# Patient Record
Sex: FEMALE | Race: WHITE | Employment: FULL TIME | URBAN - METROPOLITAN AREA
[De-identification: names, ages, dates, MRNs, and addresses within clinical notes are randomized per-mention and may not be internally consistent; named-entity substitution may affect disease eponyms.]

---

## 2017-06-27 PROBLEM — F32.81 PMDD (PREMENSTRUAL DYSPHORIC DISORDER): Status: ACTIVE | Noted: 2017-06-27

## 2017-06-27 PROBLEM — N94.6 DYSMENORRHEA: Status: ACTIVE | Noted: 2017-06-27

## 2017-06-27 PROBLEM — K42.9 UMBILICAL HERNIA WITHOUT OBSTRUCTION AND WITHOUT GANGRENE: Status: ACTIVE | Noted: 2017-06-27

## 2017-06-27 NOTE — PATIENT INSTRUCTIONS
Thank you for choosing Jaylen Wood MD at [facility]  To Do: Sukumar Parson  1. Check out various options for hormonal fluctuations  2. Start fluoxetine 1/2 tab daily for 2 weeks, then increase to full tablet after that  3.  Let us know if side e medicines prescribed to you and be aware of all of the risks of treatment even beyond those discussed today.  All therapies have potential risk of harm or side effects or medication interactions.  It is your duty and for your safety to discuss with the pha

## 2017-06-27 NOTE — PROGRESS NOTES
[facility] Internal Medicine Office Note  Chief Complaint:   Patient presents with:  Irregular Periods  Anxiety: felt more emotional before her periods       HPI:   This is a 33year old female coming in for  HPI  Irregular periods and anxiety  I following:    Height as of this encounter: 65\". Weight as of this encounter: 176 lb. Vital signs reviewed. Appears stated age, well groomed. With brown hair  Physical Exam    Constitutional: She appears well-developed and well-nourished.    Deisy Doyle Patient Active Problem List:     Bunion     Abdominal pain     Obesity (BMI 30-39. 9)     Encounter for general adult medical examination with abnormal findings     Kidney cysts     Umbilical hernia without obstruction and without gangrene     Dysmenorrhe

## 2017-10-06 ENCOUNTER — HOSPITAL ENCOUNTER (EMERGENCY)
Age: 32
Discharge: HOME | End: 2017-10-06
Attending: EMERGENCY MEDICINE
Payer: COMMERCIAL

## 2017-10-06 VITALS
BODY MASS INDEX: 27.49 KG/M2 | TEMPERATURE: 97 F | HEART RATE: 91 BPM | SYSTOLIC BLOOD PRESSURE: 124 MMHG | HEIGHT: 65 IN | DIASTOLIC BLOOD PRESSURE: 69 MMHG | OXYGEN SATURATION: 99 % | WEIGHT: 165 LBS | RESPIRATION RATE: 18 BRPM

## 2017-10-06 DIAGNOSIS — L03.90 WOUND CELLULITIS: Primary | ICD-10-CM

## 2017-10-06 PROCEDURE — 87070 CULTURE OTHR SPECIMN AEROBIC: CPT | Performed by: EMERGENCY MEDICINE

## 2017-10-06 PROCEDURE — 87205 SMEAR GRAM STAIN: CPT | Performed by: EMERGENCY MEDICINE

## 2017-10-06 PROCEDURE — 99284 EMERGENCY DEPT VISIT MOD MDM: CPT

## 2017-10-06 PROCEDURE — 96365 THER/PROPH/DIAG IV INF INIT: CPT

## 2017-10-06 RX ORDER — CEPHALEXIN 500 MG/1
500 CAPSULE ORAL 4 TIMES DAILY
Qty: 40 CAPSULE | Refills: 0 | Status: SHIPPED | OUTPATIENT
Start: 2017-10-06 | End: 2017-10-16

## 2017-10-07 NOTE — ED PROVIDER NOTES
Patient Seen in: [facility] In [place]    History   Patient presents with:  Cellulitis (integumentary, infectious)    Stated Complaint: laceration left ankle last saturday, now swollen foot and red    HPI    Patient sustained a lacera a evulsion type laceration. There is some honey colored crusting in the wound with a bright rim of erythema. Inferior to this over the lateral aspect of the ankle and foot there is some faint erythema and mild warmth.   Mild mild diffuse tenderness is not

## 2017-10-07 NOTE — ED INITIAL ASSESSMENT (HPI)
Pt cut left lateral ankle with shaver last sat. Now c/o redness and swelling. pt denies fevers or chills.

## 2017-10-13 ENCOUNTER — OFFICE VISIT (OUTPATIENT)
Dept: FAMILY MEDICINE CLINIC | Facility: CLINIC | Age: 32
End: 2017-10-13

## 2017-10-13 VITALS
RESPIRATION RATE: 16 BRPM | HEART RATE: 82 BPM | BODY MASS INDEX: 29.82 KG/M2 | TEMPERATURE: 99 F | DIASTOLIC BLOOD PRESSURE: 78 MMHG | SYSTOLIC BLOOD PRESSURE: 118 MMHG | HEIGHT: 65 IN | WEIGHT: 179 LBS

## 2017-10-13 DIAGNOSIS — S81.812D LACERATION OF LEFT LOWER EXTREMITY, SUBSEQUENT ENCOUNTER: ICD-10-CM

## 2017-10-13 DIAGNOSIS — Z09 HOSPITAL DISCHARGE FOLLOW-UP: Primary | ICD-10-CM

## 2017-10-13 PROCEDURE — 99214 OFFICE O/P EST MOD 30 MIN: CPT | Performed by: NURSE PRACTITIONER

## 2017-10-13 NOTE — PATIENT INSTRUCTIONS
Thank you for choosing JOVANNI Hernandez at [place] [address]  To Do: Love Rough  1.  Can use topical neosporin and mederma after (it heals up)  -continue w/ finishing all abx  Effective 6/19/17 until November 2017  Due to Lindsey Dove potential risk of harm or side effects or medication interactions.  It is your duty and for your safety to discuss with the pharmacist and our office with questions, and to notify us and stop treatment if problems arise, but know that our intention is that

## 2017-10-13 NOTE — PROGRESS NOTES
[facility] Internal Medicine Office Note  Chief Complaint:   Patient presents with:  ER F/U: [facility] ER 10/6/17 for L leg wound - dx: cellulitis - still on abx    HPI:   This is a 33year old female coming in for  HPI  Here today for F/u from ER o /78   Pulse 82   Temp 98.6 °F (37 °C) (Oral)   Resp 16   Ht 65\"   Wt 179 lb   LMP 09/15/2017   BMI 29.79 kg/m²  Estimated body mass index is 29.79 kg/m² as calculated from the following:    Height as of this encounter: 65\".     Weight as of this enc Tobacco Cessation Counseling 2 Years due on 01/01/1998  Annual Physical due on 09/15/2017  Influenza Vaccine(1) due on 09/01/2017  Patient/Caregiver Education: There are no barriers to learning. Medical education done.  Outcome: Patient verbalizes Brie Acevedo

## 2017-11-01 ENCOUNTER — OFFICE VISIT (OUTPATIENT)
Dept: FAMILY MEDICINE CLINIC | Facility: CLINIC | Age: 32
End: 2017-11-01

## 2017-11-01 VITALS
SYSTOLIC BLOOD PRESSURE: 116 MMHG | TEMPERATURE: 98 F | HEIGHT: 65 IN | WEIGHT: 182 LBS | RESPIRATION RATE: 16 BRPM | DIASTOLIC BLOOD PRESSURE: 72 MMHG | BODY MASS INDEX: 30.32 KG/M2 | HEART RATE: 74 BPM

## 2017-11-01 DIAGNOSIS — Z00.00 WELLNESS EXAMINATION: Primary | ICD-10-CM

## 2017-11-01 DIAGNOSIS — Z00.00 LABORATORY EXAMINATION ORDERED AS PART OF A COMPLETE PHYSICAL EXAMINATION: ICD-10-CM

## 2017-11-01 PROCEDURE — 99395 PREV VISIT EST AGE 18-39: CPT | Performed by: FAMILY MEDICINE

## 2017-11-01 RX ORDER — FLUOXETINE 20 MG/1
20 TABLET, FILM COATED ORAL DAILY
Qty: 90 TABLET | Refills: 1 | Status: SHIPPED | OUTPATIENT
Start: 2017-11-01 | End: 2018-01-10 | Stop reason: ALTCHOICE

## 2017-11-01 RX ORDER — FLUOXETINE 20 MG/1
20 TABLET, FILM COATED ORAL DAILY
COMMUNITY
End: 2018-01-10

## 2017-11-01 NOTE — TELEPHONE ENCOUNTER
Requesting fluoxetine 20mg  LOV: 11/1/17  RTC: 1 year  Last Labs: 11/1/17  Filled: 6/27/17 #60  with 1 refills    No future appointments.

## 2017-11-01 NOTE — H&P
Wellness Exam    REASON FOR VISIT:    Charisse Cali is a 33year old female who presents for an [address]     Current Complaints: none; here today for physical and biometrics for work  Flu shot: see immunization record  Health Maintenance Date Value   01/09/2015 neg       Colonoscopy Screen Every 10 years There are no preventive care reminders to display for this patient. Flex Sigmoidoscopy Screen  Every 5 years No results found for this or any previous visit.     Fecal Occult Blood  An Last attempt to quit: 12/15/2012  Smokeless tobacco: Never Used                      Alcohol use: No                   REVIEW OF SYSTEMS:   Constitutional: Negative for fever, chills and fatigue.    HENT: Negative for hearing loss, congestion, sore throat a Neurological: She is alert and oriented to person, place, and time. She has normal reflexes. Skin: Skin is warm. No rash noted. No erythema.  with normal hair  Psychiatric: She has a normal mood and affect and her behavior is normal.     ASSESSMENT AND are no barriers to learning. Medical education done. Outcome: Patient verbalizes understanding. Educated by: MD     The patient indicates understanding of these issues and agrees to the plan.     SUGGESTED VACCINATIONS - Influenza, Pneumococcal, Zoster,

## 2017-11-01 NOTE — PATIENT INSTRUCTIONS
Thank you for choosing Glenis Slater MD at [facility]  To Do: Delfina Coffey   1.   2. High Blood pressure  What Is a Normal Blood Pressure?   The Joint National Committee on Prevention, Detection, Evaluation, and Treatment of High Blood Pressu Bleeding from the aorta, the large blood vessel that supplies blood to the abdomen, pelvis, and legs   Heart failure   Poor blood supply to the legs  Erectile Dysfunction  Problems with your vision    Overview  \"Blood pressure\" is the force of blood push This diet is not only rich in important nutrients and fiber, but it also includes foods that contain far more potassium (4,700 milligrams (mg)/day), calcium (1,250 mg/day), and magnesium (500 mg/day) and much less sodium (salt) than the typical [employer] Tomatoes, carrots, broccoli, sweet potatoes, greens and other vegetables are full of fiber, vitamins, and such minerals as potassium and magnesium. Examples of one serving include 1 cup raw leafy green vegetables or 1/2 cup cut-up raw or cooked vegetables. ? Low-fat or fat-free frozen yogurt can help you boost the amount of dairy products you eat while offering a sweet treat.  Add fruit for a healthy twist.   ? If you have trouble digesting dairy products, choose lactose-free products or consider taking an ov ? Nuts sometimes get a bad rap because of their fat content, but they contain healthy types of fat — monounsaturated fat and omega-3 fatty acids. They're high in calories, however, so eat them in moderation.  Try adding them to stir-fries, salads or cereals ? When you eat sweets, choose those that are fat-free or low-fat, such as sorbets, fruit ices, jelly beans, hard candy, carlos crackers or low-fat cookies. ?  Artificial sweeteners such as aspartame (NutraSweet, Equal) and sucralose (Splenda) may help sat ? Buying foods labeled \"no salt added,\" \"sodium-free,\" \"low sodium\" or \"very low sodium\"   One teaspoon of table salt has about 2,300 mg of sodium, and 2/3 teaspoon of table salt has about 1,500 mg of sodium.  When you read food labels, you may be s •Cardiac Testing in ER building Building A second floor Cardiac Testing [phone] (For example: Holter Monitor, Cardiac Stress tests,Event Monitor, or 2D Echocardiograms)  •[facility] call [phone] usually in [address] in Clinic in We cannot refill your maintenance medications at a preventative wellness visit. To best provide you care, patients receiving maintenance medications need to be seen at least twice a year.     How to Quit Smoking  Smoking is one of the hardest habits to aletha After reviewing your smoking patterns and past attempts to quit, your doctor may offer a prescription medicine such as bupropion, varenicline, a nicotine inhaler, or nasal spray. Each has advantages and side effects. Your doctor can review these with you.

## 2018-01-10 ENCOUNTER — OFFICE VISIT (OUTPATIENT)
Dept: FAMILY MEDICINE CLINIC | Facility: CLINIC | Age: 33
End: 2018-01-10

## 2018-01-10 VITALS
HEIGHT: 65 IN | DIASTOLIC BLOOD PRESSURE: 60 MMHG | SYSTOLIC BLOOD PRESSURE: 120 MMHG | RESPIRATION RATE: 14 BRPM | WEIGHT: 179 LBS | HEART RATE: 96 BPM | TEMPERATURE: 98 F | OXYGEN SATURATION: 99 % | BODY MASS INDEX: 29.82 KG/M2

## 2018-01-10 DIAGNOSIS — R06.00 DYSPNEA, UNSPECIFIED TYPE: ICD-10-CM

## 2018-01-10 DIAGNOSIS — R06.2 WHEEZING: ICD-10-CM

## 2018-01-10 DIAGNOSIS — R05.8 PRODUCTIVE COUGH: Primary | ICD-10-CM

## 2018-01-10 PROCEDURE — 99214 OFFICE O/P EST MOD 30 MIN: CPT | Performed by: FAMILY MEDICINE

## 2018-01-10 RX ORDER — AMOXICILLIN AND CLAVULANATE POTASSIUM 875; 125 MG/1; MG/1
1 TABLET, FILM COATED ORAL 2 TIMES DAILY
Qty: 20 TABLET | Refills: 0 | Status: SHIPPED | OUTPATIENT
Start: 2018-01-10 | End: 2018-01-20

## 2018-01-10 RX ORDER — PREDNISONE 20 MG/1
20 TABLET ORAL 2 TIMES DAILY
Qty: 10 TABLET | Refills: 0 | Status: SHIPPED | OUTPATIENT
Start: 2018-01-10 | End: 2018-01-15 | Stop reason: ALTCHOICE

## 2018-01-10 RX ORDER — ALBUTEROL SULFATE 90 UG/1
2 AEROSOL, METERED RESPIRATORY (INHALATION) EVERY 4 HOURS PRN
Qty: 1 INHALER | Refills: 0 | Status: SHIPPED | OUTPATIENT
Start: 2018-01-10 | End: 2018-05-15 | Stop reason: ALTCHOICE

## 2018-01-15 ENCOUNTER — OFFICE VISIT (OUTPATIENT)
Dept: FAMILY MEDICINE CLINIC | Facility: CLINIC | Age: 33
End: 2018-01-15

## 2018-01-15 VITALS
WEIGHT: 180 LBS | TEMPERATURE: 98 F | BODY MASS INDEX: 29.99 KG/M2 | HEIGHT: 65 IN | OXYGEN SATURATION: 99 % | SYSTOLIC BLOOD PRESSURE: 118 MMHG | RESPIRATION RATE: 16 BRPM | DIASTOLIC BLOOD PRESSURE: 72 MMHG | HEART RATE: 83 BPM

## 2018-01-15 DIAGNOSIS — R06.2 WHEEZING: ICD-10-CM

## 2018-01-15 DIAGNOSIS — R05.8 PRODUCTIVE COUGH: Primary | ICD-10-CM

## 2018-01-15 PROCEDURE — 99213 OFFICE O/P EST LOW 20 MIN: CPT | Performed by: FAMILY MEDICINE

## 2018-01-16 NOTE — PROGRESS NOTES
HPI:    Patient ID: Daniella Rodriguez is a 29year old female. HPI  The patient is here for follow-up of productive cough. She is doing better. She is on Augmentin. He is albuterol as needed.   Review of Systems  Negative except for the above       Curr by mouth 2 (two) times daily. Albuterol Sulfate  (90 Base) MCG/ACT Inhalation Aero Soln 1 Inhaler 0      Sig: Inhale 2 puffs into the lungs every 4 (four) hours as needed for Wheezing or Shortness of Breath.            Imaging & Referrals:  None

## 2018-01-18 NOTE — PROGRESS NOTES
HPI:    Patient ID: Adi Villatoro is a 29year old female. HPI  Patient is here for follow-up of productive cough and wheezing. She is doing somewhat better. No side effects antibiotic.   Review of Systems  Negative except for the above       Current O

## 2018-01-24 ENCOUNTER — OFFICE VISIT (OUTPATIENT)
Dept: FAMILY MEDICINE CLINIC | Facility: CLINIC | Age: 33
End: 2018-01-24

## 2018-01-24 VITALS
TEMPERATURE: 98 F | OXYGEN SATURATION: 98 % | RESPIRATION RATE: 12 BRPM | DIASTOLIC BLOOD PRESSURE: 60 MMHG | SYSTOLIC BLOOD PRESSURE: 120 MMHG | HEART RATE: 118 BPM

## 2018-01-24 DIAGNOSIS — R06.00 DYSPNEA, UNSPECIFIED TYPE: ICD-10-CM

## 2018-01-24 DIAGNOSIS — R05.8 PRODUCTIVE COUGH: Primary | ICD-10-CM

## 2018-01-24 PROCEDURE — 99213 OFFICE O/P EST LOW 20 MIN: CPT | Performed by: FAMILY MEDICINE

## 2018-01-25 NOTE — PROGRESS NOTES
HPI:    Patient ID: Marco Azul is a 29year old female. HPI  Patient is here for follow-up of productive cough and dyspnea. She is feeling much better now. She had been using albuterol inhaler.   Review of Systems  Negative except for the above

## 2018-02-14 NOTE — TELEPHONE ENCOUNTER
Requested Medications   VENTOLIN HFA INH W/DOS CTR 200PUFFS  Will file in chart as: VENTOLIN  (90 Base) MCG/ACT Inhalation Aero Soln  INHALE 2 PUFFS INTO THE LUNGS EVERY 4 HOURS AS NEEDED FOR WHEEZING OR SHORTNESS OF BREATH  Disp: 18 g Refills: 0

## 2018-05-15 ENCOUNTER — OFFICE VISIT (OUTPATIENT)
Dept: FAMILY MEDICINE CLINIC | Facility: CLINIC | Age: 33
End: 2018-05-15

## 2018-05-15 VITALS
RESPIRATION RATE: 12 BRPM | BODY MASS INDEX: 30 KG/M2 | DIASTOLIC BLOOD PRESSURE: 78 MMHG | WEIGHT: 183 LBS | OXYGEN SATURATION: 100 % | TEMPERATURE: 98 F | HEART RATE: 73 BPM | SYSTOLIC BLOOD PRESSURE: 118 MMHG

## 2018-05-15 DIAGNOSIS — B02.9 HERPES ZOSTER WITHOUT COMPLICATION: ICD-10-CM

## 2018-05-15 DIAGNOSIS — Z00.00 ROUTINE GENERAL MEDICAL EXAMINATION AT A HEALTH CARE FACILITY: Primary | ICD-10-CM

## 2018-05-15 PROCEDURE — 99395 PREV VISIT EST AGE 18-39: CPT | Performed by: FAMILY MEDICINE

## 2018-05-15 PROCEDURE — 99212 OFFICE O/P EST SF 10 MIN: CPT | Performed by: FAMILY MEDICINE

## 2018-05-15 RX ORDER — VALACYCLOVIR HYDROCHLORIDE 1 G/1
1 TABLET, FILM COATED ORAL 3 TIMES DAILY
Qty: 21 TABLET | Refills: 0 | Status: SHIPPED | OUTPATIENT
Start: 2018-05-15 | End: 2018-05-22

## 2018-05-15 NOTE — PROGRESS NOTES
HPI:   Jhon Silverio is a 29year old female who presents for a complete physical exam. Symptoms: denies discharge, itching, burning or dysuria. Patient has no complaints today.     Wt Readings from Last 6 Encounters:  05/15/18 : 183 lb  01/15/18 : 180 lb fL   MCH 28.1 27.0 - 33.0 pg   MCHC 33.7 32.0 - 36.0 g/dL   RDW 12.9 11.0 - 15.0 %   PLATELET COUNT 316 322 - 400 Thousand/uL   MPV 10.3 7.5 - 12.5 fL   ABSOLUTE NEUTROPHILS 3,323 1,500 - 7,800 cells/uL   ABSOLUTE LYMPHOCYTES 3,315 850 - 3,900 cells/uL   A 118/78   Pulse 73   Temp 97.9 °F (36.6 °C) (Oral)   Resp 12   Wt 183 lb   LMP 05/10/2018   SpO2 100%   BMI 30.45 kg/m²   Body mass index is 30.45 kg/m².    GENERAL: well developed, well nourished and in no apparent distress  SKIN: Macular vesicular rash in these recommendations and agrees to the plan. The patient is asked to return for complete physical yearly. Patient seen her gynecologist and was given Prozac for having irregular menstrual cycle 1 every 1-3 months.   She would like to consider hormona

## 2018-05-22 ENCOUNTER — OFFICE VISIT (OUTPATIENT)
Dept: FAMILY MEDICINE CLINIC | Facility: CLINIC | Age: 33
End: 2018-05-22

## 2018-05-22 VITALS
WEIGHT: 183 LBS | HEIGHT: 65 IN | HEART RATE: 92 BPM | SYSTOLIC BLOOD PRESSURE: 118 MMHG | BODY MASS INDEX: 30.49 KG/M2 | RESPIRATION RATE: 12 BRPM | TEMPERATURE: 99 F | OXYGEN SATURATION: 100 % | DIASTOLIC BLOOD PRESSURE: 70 MMHG

## 2018-05-22 DIAGNOSIS — R79.89 ELEVATED PLATELET COUNT: ICD-10-CM

## 2018-05-22 DIAGNOSIS — E78.00 HIGH CHOLESTEROL: ICD-10-CM

## 2018-05-22 DIAGNOSIS — B02.9 HERPES ZOSTER WITHOUT COMPLICATION: ICD-10-CM

## 2018-05-22 DIAGNOSIS — D49.2 NEOPLASM OF SKIN OF FOREHEAD: Primary | ICD-10-CM

## 2018-05-22 PROCEDURE — 99214 OFFICE O/P EST MOD 30 MIN: CPT | Performed by: FAMILY MEDICINE

## 2018-05-22 NOTE — PROGRESS NOTES
HPI:    Patient ID: Betsy Pozo is a 29year old female. HPI  Patient is here for follow-up of shingles and lab work. She also has a skin lesion between her eyebrow that she would like to get removed.   Shingles is much better and she has minimal itch still has any symptoms I would recommend a course of prednisone and she will call me regarding that. Blood work reviewed showing mildly elevated cholesterol pattern and counseled regarding good diet and exercise.   Platelet count mildly elevated no signs o

## 2018-08-29 ENCOUNTER — TELEPHONE (OUTPATIENT)
Dept: FAMILY MEDICINE CLINIC | Facility: CLINIC | Age: 33
End: 2018-08-29

## 2018-08-29 NOTE — TELEPHONE ENCOUNTER
rec'd a msg left on 08-28-18 at 6:07 pm - patient called to cancel their appt on 08-30-18 at 10:15 am - appt cancelled

## 2018-09-21 ENCOUNTER — APPOINTMENT (OUTPATIENT)
Dept: LAB | Age: 33
End: 2018-09-21
Attending: FAMILY MEDICINE
Payer: COMMERCIAL

## 2018-09-21 LAB
ERYTHROCYTE [DISTWIDTH] IN BLOOD BY AUTOMATED COUNT: 12.6 % (ref 11.5–16)
HCT VFR BLD AUTO: 44.6 % (ref 34–50)
HGB BLD-MCNC: 14.3 G/DL (ref 12–16)
MCH RBC QN AUTO: 27.5 PG (ref 27–33.2)
MCHC RBC AUTO-ENTMCNC: 32.1 G/DL (ref 31–37)
MCV RBC AUTO: 85.8 FL (ref 81–100)
PLATELET # BLD AUTO: 391 10(3)UL (ref 150–450)
RBC # BLD AUTO: 5.2 X10(6)UL (ref 3.8–5.1)
RED CELL DISTRIBUTION WIDTH-SD: 39.2 FL (ref 35.1–46.3)
WBC # BLD AUTO: 9.5 X10(3) UL (ref 4–13)

## 2018-10-21 ENCOUNTER — APPOINTMENT (OUTPATIENT)
Dept: CT IMAGING | Age: 33
End: 2018-10-21
Attending: EMERGENCY MEDICINE
Payer: COMMERCIAL

## 2018-10-21 ENCOUNTER — HOSPITAL ENCOUNTER (EMERGENCY)
Age: 33
Discharge: HOME | End: 2018-10-21
Attending: EMERGENCY MEDICINE
Payer: COMMERCIAL

## 2018-10-21 VITALS
SYSTOLIC BLOOD PRESSURE: 106 MMHG | WEIGHT: 175 LBS | OXYGEN SATURATION: 100 % | RESPIRATION RATE: 16 BRPM | BODY MASS INDEX: 29.16 KG/M2 | TEMPERATURE: 98 F | DIASTOLIC BLOOD PRESSURE: 65 MMHG | HEIGHT: 65 IN | HEART RATE: 81 BPM

## 2018-10-21 DIAGNOSIS — G43.909 MIGRAINE WITHOUT STATUS MIGRAINOSUS, NOT INTRACTABLE, UNSPECIFIED MIGRAINE TYPE: ICD-10-CM

## 2018-10-21 DIAGNOSIS — H10.31 ACUTE CONJUNCTIVITIS OF RIGHT EYE, UNSPECIFIED ACUTE CONJUNCTIVITIS TYPE: Primary | ICD-10-CM

## 2018-10-21 PROCEDURE — 99284 EMERGENCY DEPT VISIT MOD MDM: CPT

## 2018-10-21 PROCEDURE — 70450 CT HEAD/BRAIN W/O DYE: CPT | Performed by: EMERGENCY MEDICINE

## 2018-10-21 RX ORDER — TETRACAINE HYDROCHLORIDE 5 MG/ML
2 SOLUTION OPHTHALMIC ONCE
Status: COMPLETED | OUTPATIENT
Start: 2018-10-21 | End: 2018-10-21

## 2018-10-21 RX ORDER — TOBRAMYCIN 3 MG/ML
2 SOLUTION/ DROPS OPHTHALMIC EVERY 6 HOURS
Qty: 1 BOTTLE | Refills: 0 | Status: SHIPPED | OUTPATIENT
Start: 2018-10-21 | End: 2018-10-28

## 2018-10-21 RX ORDER — BUTALBITAL, ACETAMINOPHEN AND CAFFEINE 50; 325; 40 MG/1; MG/1; MG/1
1-2 TABLET ORAL EVERY 4 HOURS PRN
Qty: 10 TABLET | Refills: 0 | Status: SHIPPED | OUTPATIENT
Start: 2018-10-21 | End: 2018-10-28

## 2018-10-21 NOTE — ED PROVIDER NOTES
Patient Seen in: [facility] [place]    History   Patient presents with:  Headache (neurologic)    Stated Complaint: headache for two weeks, right eye blurred vision    HPI    52-year-old female complaining of right eye redness.   Pa Acuity: 20/30, Uncorrected    Physical Exam    Patient is alert and oriented x3 no acute distress HEENT exam the pupils are equal round react to light extraocular muscles are intact left eye is grossly normal the right eye the conjunctiva is moderately inj 0    Butalbital-APAP-Caffeine -40 MG Oral Tab  Take 1-2 tablets by mouth every 4 (four) hours as needed for Pain.   Qty: 10 tablet Refills: 0

## 2018-10-21 NOTE — ED INITIAL ASSESSMENT (HPI)
Pt c/o eye redness and drainage. Blurred vision 2 days. No fever. Also c/o intermittent headache x 2 weeks. \"Taken excedrin, but headache comes back\".

## 2018-10-22 ENCOUNTER — PATIENT OUTREACH (OUTPATIENT)
Dept: FAMILY MEDICINE CLINIC | Facility: CLINIC | Age: 33
End: 2018-10-22

## 2018-10-24 ENCOUNTER — OFFICE VISIT (OUTPATIENT)
Dept: FAMILY MEDICINE CLINIC | Facility: CLINIC | Age: 33
End: 2018-10-24
Payer: COMMERCIAL

## 2018-10-24 VITALS
WEIGHT: 182 LBS | TEMPERATURE: 99 F | OXYGEN SATURATION: 98 % | BODY MASS INDEX: 30.32 KG/M2 | HEIGHT: 65 IN | HEART RATE: 94 BPM | DIASTOLIC BLOOD PRESSURE: 70 MMHG | RESPIRATION RATE: 12 BRPM | SYSTOLIC BLOOD PRESSURE: 120 MMHG

## 2018-10-24 DIAGNOSIS — H10.9 CONJUNCTIVITIS OF RIGHT EYE, UNSPECIFIED CONJUNCTIVITIS TYPE: ICD-10-CM

## 2018-10-24 DIAGNOSIS — G43.009 MIGRAINE WITHOUT AURA AND WITHOUT STATUS MIGRAINOSUS, NOT INTRACTABLE: ICD-10-CM

## 2018-10-24 DIAGNOSIS — J30.9 ALLERGIC RHINITIS, UNSPECIFIED SEASONALITY, UNSPECIFIED TRIGGER: ICD-10-CM

## 2018-10-24 DIAGNOSIS — R53.82 CHRONIC FATIGUE: Primary | ICD-10-CM

## 2018-10-24 PROCEDURE — 99214 OFFICE O/P EST MOD 30 MIN: CPT | Performed by: FAMILY MEDICINE

## 2018-10-24 NOTE — PROGRESS NOTES
HPI:    Patient ID: Wenceslao Hay is a 29year old female. HPI  Patient is here for follow-up of ER visit for migraine headache. She was given Fioricet also diagnosed with right eye conjunctivitis and given tobramycin eyedrops.   Eye has improved signif tenderness, negative straight leg raise bilaterally, no joint swelling or tenderness, normal strength, range of motion and sensation in all four extremities.   Neurologic:  alert and oriented to time, space, and person, cranial nerves two through twelve daljit

## 2018-12-13 PROBLEM — N92.6 IRREGULAR MENSTRUATION: Status: ACTIVE | Noted: 2018-12-13

## 2018-12-13 PROCEDURE — 84403 ASSAY OF TOTAL TESTOSTERONE: CPT | Performed by: INTERNAL MEDICINE

## 2018-12-13 PROCEDURE — 84402 ASSAY OF FREE TESTOSTERONE: CPT | Performed by: INTERNAL MEDICINE

## 2018-12-13 PROCEDURE — 83498 ASY HYDROXYPROGESTERONE 17-D: CPT | Performed by: INTERNAL MEDICINE

## 2018-12-22 ENCOUNTER — TELEPHONE (OUTPATIENT)
Dept: FAMILY MEDICINE CLINIC | Facility: CLINIC | Age: 33
End: 2018-12-22

## 2018-12-22 NOTE — TELEPHONE ENCOUNTER
[address], [phone], [phone]   Medication Detail      Disp Refills Start End    FLUoxetine HCl 20 MG Oral Tab (Discontinued) 90 tablet 1 11/1/2017 1/10/201

## 2019-01-18 PROCEDURE — 87624 HPV HI-RISK TYP POOLED RSLT: CPT | Performed by: OBSTETRICS & GYNECOLOGY

## 2019-01-18 PROCEDURE — 88175 CYTOPATH C/V AUTO FLUID REDO: CPT | Performed by: OBSTETRICS & GYNECOLOGY

## 2019-01-21 ENCOUNTER — OFFICE VISIT (OUTPATIENT)
Dept: FAMILY MEDICINE CLINIC | Facility: CLINIC | Age: 34
End: 2019-01-21
Payer: COMMERCIAL

## 2019-01-21 VITALS
DIASTOLIC BLOOD PRESSURE: 68 MMHG | OXYGEN SATURATION: 98 % | RESPIRATION RATE: 12 BRPM | WEIGHT: 181 LBS | HEART RATE: 85 BPM | HEIGHT: 65 IN | TEMPERATURE: 98 F | BODY MASS INDEX: 30.16 KG/M2 | SYSTOLIC BLOOD PRESSURE: 108 MMHG

## 2019-01-21 DIAGNOSIS — R05.8 PRODUCTIVE COUGH: Primary | ICD-10-CM

## 2019-01-21 PROCEDURE — 99213 OFFICE O/P EST LOW 20 MIN: CPT | Performed by: FAMILY MEDICINE

## 2019-01-21 RX ORDER — CODEINE PHOSPHATE AND GUAIFENESIN 10; 100 MG/5ML; MG/5ML
5 SOLUTION ORAL EVERY 6 HOURS PRN
Qty: 120 ML | Refills: 0 | Status: SHIPPED | OUTPATIENT
Start: 2019-01-21 | End: 2019-02-01 | Stop reason: ALTCHOICE

## 2019-01-21 RX ORDER — AMOXICILLIN AND CLAVULANATE POTASSIUM 500; 125 MG/1; MG/1
1 TABLET, FILM COATED ORAL 2 TIMES DAILY
Qty: 20 TABLET | Refills: 0 | Status: SHIPPED | OUTPATIENT
Start: 2019-01-21 | End: 2019-02-01 | Stop reason: ALTCHOICE

## 2019-01-21 RX ORDER — PREDNISONE 20 MG/1
20 TABLET ORAL 2 TIMES DAILY
Qty: 10 TABLET | Refills: 0 | Status: SHIPPED | OUTPATIENT
Start: 2019-01-21 | End: 2019-01-26

## 2019-01-21 NOTE — PROGRESS NOTES
HPI:   Hanny Estes is a 36year old female that presents for cough. Cough- patient states she has had cough since around pavel time. She has tried OTC meds with no relief. She denies any fever.  She does complain of some back pain due to coughing f well groomed. Physical Exam:  GEN:  Patient is alert, awake and oriented, well developed, well nourished, no apparent distress.   HEENT:     Head:  Normocephalic, atraumatic    Eyes: EOMI, PERRLA, no scleral icterus, conjunctivae clear, no eye discharge

## 2019-02-01 ENCOUNTER — OFFICE VISIT (OUTPATIENT)
Dept: FAMILY MEDICINE CLINIC | Facility: CLINIC | Age: 34
End: 2019-02-01
Payer: COMMERCIAL

## 2019-02-01 VITALS
OXYGEN SATURATION: 99 % | WEIGHT: 182 LBS | BODY MASS INDEX: 30.32 KG/M2 | SYSTOLIC BLOOD PRESSURE: 110 MMHG | DIASTOLIC BLOOD PRESSURE: 70 MMHG | TEMPERATURE: 98 F | HEIGHT: 65 IN | RESPIRATION RATE: 16 BRPM | HEART RATE: 74 BPM

## 2019-02-01 DIAGNOSIS — R05.8 PRODUCTIVE COUGH: Primary | ICD-10-CM

## 2019-02-01 PROCEDURE — 99213 OFFICE O/P EST LOW 20 MIN: CPT | Performed by: FAMILY MEDICINE

## 2019-02-01 NOTE — PROGRESS NOTES
HPI:   Jessie Lieberman is a 36year old female that presents for follow up on sick visit. Completed antibiotics and cough medication. Patient states she is feeling overall better at this time.      Past medical, surgical, family and social history reviewed in dentition. NECK: Supple, no cervical LAD, no thyromegaly. SKIN: No rashes, no skin lesion, no bruising, good turgor. HEART:  Regular rate and rhythm, no murmurs, rubs or gallops. LUNGS: Clear to auscultation bilterally, no rales/rhonchi/wheezing.   ABDO

## 2019-04-23 ENCOUNTER — OFFICE VISIT (OUTPATIENT)
Dept: FAMILY MEDICINE CLINIC | Facility: CLINIC | Age: 34
End: 2019-04-23
Payer: COMMERCIAL

## 2019-04-23 VITALS
WEIGHT: 180 LBS | TEMPERATURE: 98 F | RESPIRATION RATE: 14 BRPM | SYSTOLIC BLOOD PRESSURE: 120 MMHG | OXYGEN SATURATION: 100 % | HEART RATE: 83 BPM | DIASTOLIC BLOOD PRESSURE: 70 MMHG | BODY MASS INDEX: 30 KG/M2

## 2019-04-23 DIAGNOSIS — F32.A DEPRESSION, UNSPECIFIED DEPRESSION TYPE: Primary | ICD-10-CM

## 2019-04-23 DIAGNOSIS — H92.02 LEFT EAR PAIN: ICD-10-CM

## 2019-04-23 DIAGNOSIS — F41.1 GENERALIZED ANXIETY DISORDER: ICD-10-CM

## 2019-04-23 PROBLEM — R45.86 MOOD CHANGES: Status: ACTIVE | Noted: 2019-04-23

## 2019-04-23 PROCEDURE — 99214 OFFICE O/P EST MOD 30 MIN: CPT | Performed by: FAMILY MEDICINE

## 2019-04-23 PROCEDURE — 96127 BRIEF EMOTIONAL/BEHAV ASSMT: CPT | Performed by: FAMILY MEDICINE

## 2019-04-23 RX ORDER — VENLAFAXINE HYDROCHLORIDE 37.5 MG/1
CAPSULE, EXTENDED RELEASE ORAL
Qty: 49 CAPSULE | Refills: 0 | Status: SHIPPED | OUTPATIENT
Start: 2019-04-23 | End: 2019-09-13 | Stop reason: DRUGHIGH

## 2019-04-23 RX ORDER — VENLAFAXINE HYDROCHLORIDE 37.5 MG/1
CAPSULE, EXTENDED RELEASE ORAL
Qty: 157 CAPSULE | Refills: 0 | OUTPATIENT
Start: 2019-04-23

## 2019-04-23 NOTE — PROGRESS NOTES
HPI:   Corinne Lamer is a 36year old female that presents for anxiety    Patient states her anxiety is getting worse. She did stop bcp due to worsening anxiety symptoms, she says she states the anxiety did get better.   She does have some mild depression b 29.95 kg/m² as calculated from the following:    Height as of 2/1/19: 65\". Weight as of this encounter: 180 lb. Vital signs reviewed. Appears stated age, well groomed.   Physical Exam:  GEN:  Patient is alert, awake and oriented, well developed, well n medications discussed in detail. Patient told to call 911 if any suicidal thoughts develop and to seek medical attention immediately if any worsening of depression and/or anxiety occurs.  Patient verbalizes understanding and agrees completely with Milena Kent

## 2019-05-10 ENCOUNTER — APPOINTMENT (OUTPATIENT)
Dept: LAB | Age: 34
End: 2019-05-10
Attending: NURSE PRACTITIONER
Payer: COMMERCIAL

## 2019-05-14 ENCOUNTER — OFFICE VISIT (OUTPATIENT)
Dept: FAMILY MEDICINE CLINIC | Facility: CLINIC | Age: 34
End: 2019-05-14
Payer: COMMERCIAL

## 2019-05-14 VITALS
HEIGHT: 65 IN | RESPIRATION RATE: 16 BRPM | TEMPERATURE: 97 F | WEIGHT: 178 LBS | OXYGEN SATURATION: 99 % | SYSTOLIC BLOOD PRESSURE: 118 MMHG | BODY MASS INDEX: 29.66 KG/M2 | HEART RATE: 73 BPM | DIASTOLIC BLOOD PRESSURE: 70 MMHG

## 2019-05-14 DIAGNOSIS — F41.1 GENERALIZED ANXIETY DISORDER: ICD-10-CM

## 2019-05-14 DIAGNOSIS — F32.A DEPRESSION, UNSPECIFIED DEPRESSION TYPE: Primary | ICD-10-CM

## 2019-05-14 PROCEDURE — 99213 OFFICE O/P EST LOW 20 MIN: CPT | Performed by: FAMILY MEDICINE

## 2019-05-14 RX ORDER — VENLAFAXINE HYDROCHLORIDE 75 MG/1
75 CAPSULE, EXTENDED RELEASE ORAL EVERY MORNING
Qty: 90 CAPSULE | Refills: 0 | Status: SHIPPED | OUTPATIENT
Start: 2019-05-14 | End: 2019-08-12

## 2019-05-14 NOTE — PROGRESS NOTES
HPI:   Alexia Duke is a 36year old female that presents for Depression/anxiety follow up- started Effexor 37.5mg for 1 week then was to increase to 75mg after. She has been doing well on medication and states she hasn't had any mood swings.  She has a we nourished, no apparent distress.   HEENT:     Head:  Normocephalic, atraumatic    Eyes: EOMI, PERRLA, no scleral icterus, conjunctivae clear, no eye discharge    Ears: External normal. TMs normal without erythema or effusion   Nose: patent, no nasal dischar

## 2019-08-12 RX ORDER — VENLAFAXINE HYDROCHLORIDE 75 MG/1
CAPSULE, EXTENDED RELEASE ORAL
Qty: 90 CAPSULE | Refills: 0 | Status: SHIPPED | OUTPATIENT
Start: 2019-08-12 | End: 2020-07-06

## 2019-08-12 NOTE — TELEPHONE ENCOUNTER
Requested Prescriptions     Pending Prescriptions Disp Refills   • VENLAFAXINE HCL ER 75 MG Oral Capsule SR 24 Hr [Pharmacy Med Name: VENLAFAXINE ER 75MG CAPSULES] 90 capsule 0     Sig: TAKE ONE CAPSULE BY MOUTH EVERY MORNING       LOV: 5/14/2019  RTC: 2 M

## 2019-09-13 ENCOUNTER — OFFICE VISIT (OUTPATIENT)
Dept: FAMILY MEDICINE CLINIC | Facility: CLINIC | Age: 34
End: 2019-09-13
Payer: COMMERCIAL

## 2019-09-13 VITALS
SYSTOLIC BLOOD PRESSURE: 115 MMHG | BODY MASS INDEX: 30.79 KG/M2 | TEMPERATURE: 98 F | WEIGHT: 184.81 LBS | HEIGHT: 65 IN | HEART RATE: 70 BPM | DIASTOLIC BLOOD PRESSURE: 80 MMHG

## 2019-09-13 DIAGNOSIS — M25.561 ACUTE PAIN OF RIGHT KNEE: ICD-10-CM

## 2019-09-13 DIAGNOSIS — R10.84 GENERALIZED ABDOMINAL PAIN: Primary | ICD-10-CM

## 2019-09-13 PROCEDURE — 99214 OFFICE O/P EST MOD 30 MIN: CPT | Performed by: FAMILY MEDICINE

## 2019-09-14 NOTE — PROGRESS NOTES
HPI:   Christine Ballard is a 36year old female that presents for Patient presents with:  Gastro-esophageal Reflux: Has been having a bruning sensation in her stomach after hes done eating/drinking. Has been going on for a long time.   Imm/Inj: Patient decline Eyes: EOMI, PERRLA, no scleral icterus, conjunctivae clear, no eye discharge    Ears: External normal. TMs normal without erythema or effusion   Nose: patent, no nasal discharge    Throat:  No tonsillar erythema or exudate.      Mouth:  No oral lesions o

## 2019-09-19 LAB — RESULT:: DETECTED

## 2019-09-23 ENCOUNTER — TELEPHONE (OUTPATIENT)
Dept: FAMILY MEDICINE CLINIC | Facility: CLINIC | Age: 34
End: 2019-09-23

## 2019-09-23 RX ORDER — LANSOPRAZOLE, AMOXICILLIN, CLARITHROMYCIN 30-500-500
1 KIT ORAL 2 TIMES DAILY
Refills: 0 | Status: CANCELLED | OUTPATIENT
Start: 2019-09-23

## 2019-09-23 RX ORDER — AMOXICILLIN 500 MG/1
1000 TABLET, FILM COATED ORAL 2 TIMES DAILY
Qty: 56 TABLET | Refills: 0 | Status: SHIPPED | OUTPATIENT
Start: 2019-09-23 | End: 2019-10-25

## 2019-09-23 NOTE — TELEPHONE ENCOUNTER
lansoprazole 30mg bid, amoxicillin 1gm bid, clarithromycin XL 500mg bid    All for at least 10 days and if tolerated, 14 days. Dispense 14 day course.

## 2019-09-23 NOTE — TELEPHONE ENCOUNTER
----- Message from Armida Castaneda MD sent at 9/23/2019 11:02 AM CDT -----  h pylori is positive. Please send Prevpac.

## 2019-10-11 ENCOUNTER — OFFICE VISIT (OUTPATIENT)
Dept: FAMILY MEDICINE CLINIC | Facility: CLINIC | Age: 34
End: 2019-10-11
Payer: COMMERCIAL

## 2019-10-11 VITALS
DIASTOLIC BLOOD PRESSURE: 80 MMHG | SYSTOLIC BLOOD PRESSURE: 120 MMHG | WEIGHT: 187.63 LBS | HEART RATE: 89 BPM | HEIGHT: 65 IN | TEMPERATURE: 98 F | BODY MASS INDEX: 31.26 KG/M2

## 2019-10-11 DIAGNOSIS — M25.561 RIGHT KNEE PAIN, UNSPECIFIED CHRONICITY: Primary | ICD-10-CM

## 2019-10-11 PROCEDURE — 99213 OFFICE O/P EST LOW 20 MIN: CPT | Performed by: FAMILY MEDICINE

## 2019-10-11 RX ORDER — PREDNISONE 20 MG/1
20 TABLET ORAL 2 TIMES DAILY
Qty: 10 TABLET | Refills: 0 | Status: SHIPPED | OUTPATIENT
Start: 2019-10-11 | End: 2019-10-16

## 2019-10-15 PROBLEM — M25.561 RIGHT KNEE PAIN: Status: ACTIVE | Noted: 2019-10-15

## 2019-10-15 NOTE — PROGRESS NOTES
HPI:    Patient ID: Jet Potter is a 36year old female. HPI  Patient presents with: Follow - Up: Patient is still having Rt knee sever pain. Pain 7/10.   Imm/Inj: Declined flu vaccine      Review of Systems  Except for the above, all other ROS are ne

## 2019-10-25 ENCOUNTER — OFFICE VISIT (OUTPATIENT)
Dept: FAMILY MEDICINE CLINIC | Facility: CLINIC | Age: 34
End: 2019-10-25
Payer: COMMERCIAL

## 2019-10-25 ENCOUNTER — HOSPITAL ENCOUNTER (OUTPATIENT)
Dept: GENERAL RADIOLOGY | Age: 34
Discharge: HOME | End: 2019-10-25
Attending: FAMILY MEDICINE
Payer: COMMERCIAL

## 2019-10-25 VITALS
WEIGHT: 188.63 LBS | BODY MASS INDEX: 31.43 KG/M2 | HEART RATE: 95 BPM | RESPIRATION RATE: 16 BRPM | OXYGEN SATURATION: 99 % | DIASTOLIC BLOOD PRESSURE: 80 MMHG | HEIGHT: 65 IN | TEMPERATURE: 97 F | SYSTOLIC BLOOD PRESSURE: 115 MMHG

## 2019-10-25 DIAGNOSIS — M25.569 KNEE PAIN, UNSPECIFIED CHRONICITY, UNSPECIFIED LATERALITY: ICD-10-CM

## 2019-10-25 DIAGNOSIS — M25.569 KNEE PAIN, UNSPECIFIED CHRONICITY, UNSPECIFIED LATERALITY: Primary | ICD-10-CM

## 2019-10-25 PROCEDURE — 99213 OFFICE O/P EST LOW 20 MIN: CPT | Performed by: FAMILY MEDICINE

## 2019-10-25 PROCEDURE — 73560 X-RAY EXAM OF KNEE 1 OR 2: CPT | Performed by: FAMILY MEDICINE

## 2019-10-25 RX ORDER — ETODOLAC 400 MG/1
400 TABLET, FILM COATED ORAL 2 TIMES DAILY
Qty: 30 TABLET | Refills: 0 | Status: SHIPPED | OUTPATIENT
Start: 2019-10-25 | End: 2020-07-06

## 2019-10-25 NOTE — PROGRESS NOTES
HPI:    Patient ID: Justa Nair is a 36year old female. HPI  Patient is here for follow-up of right knee pain she is. She states prednisone did not help she still has severe pain in the right knee especially when she climbs up steps.   She has pain i

## 2019-10-28 RX ORDER — ETODOLAC 400 MG/1
TABLET, FILM COATED ORAL
Qty: 180 TABLET | Refills: 0 | OUTPATIENT
Start: 2019-10-28

## 2019-11-06 ENCOUNTER — TELEPHONE (OUTPATIENT)
Dept: FAMILY MEDICINE CLINIC | Facility: CLINIC | Age: 34
End: 2019-11-06

## 2019-11-06 DIAGNOSIS — M25.561 RIGHT KNEE PAIN, UNSPECIFIED CHRONICITY: Primary | ICD-10-CM

## 2019-11-06 NOTE — TELEPHONE ENCOUNTER
[facility] Clinical Staff             Hello,     Per Miladna this test does not meet medial necessity and has been denied.  Per their guidelines:     Based on [employer] [facility] Guidelines, we cannot approve this request. Ronald Almazan

## 2019-11-11 NOTE — TELEPHONE ENCOUNTER
LMOM for pt on pt's cell as [employer] consent form allows that MRI was denied and with Dr. Shanna Lopez recommendations. Gave contact information for Dr. Lyly nichols. Requested a return call with questions. Task completed.

## 2020-07-06 ENCOUNTER — OFFICE VISIT (OUTPATIENT)
Dept: FAMILY MEDICINE CLINIC | Facility: CLINIC | Age: 35
End: 2020-07-06
Payer: COMMERCIAL

## 2020-07-06 VITALS
DIASTOLIC BLOOD PRESSURE: 80 MMHG | RESPIRATION RATE: 16 BRPM | OXYGEN SATURATION: 99 % | HEART RATE: 66 BPM | WEIGHT: 170 LBS | SYSTOLIC BLOOD PRESSURE: 115 MMHG | TEMPERATURE: 98 F | HEIGHT: 65 IN | BODY MASS INDEX: 28.32 KG/M2

## 2020-07-06 DIAGNOSIS — Z00.00 ROUTINE GENERAL MEDICAL EXAMINATION AT A HEALTH CARE FACILITY: Primary | ICD-10-CM

## 2020-07-06 PROCEDURE — 99395 PREV VISIT EST AGE 18-39: CPT | Performed by: FAMILY MEDICINE

## 2020-07-06 NOTE — PROGRESS NOTES
HPI:   Marnie Sloan is a 30year old female who presents for a complete physical exam.    Patient has no complaints today.     Wt Readings from Last 6 Encounters:  07/06/20 : 170 lb (77.1 kg)  10/25/19 : 188 lb 9.6 oz (85.5 kg)  10/11/19 : 187 lb 9.6 oz (8 denies leg cramps  GI: denies abdominal pain, bowel movement changes, blood in stool  : denies urinary problems, vaginal discharge or discomfort   MUSCULOSKELETAL: denies joint pain or stiffness  NEURO: denies headaches, tingling or dizziness  PSYCHE: de she did not like that. We will hold off on any medication at this time if her symptoms get worse she is to seek medical attention. Patient agrees with plan. Patient instructed on self breast exam monthly.      Patient instructed on getting yearly Pap s

## 2020-07-07 LAB
ABSOLUTE BASOPHILS: 41 CELLS/UL (ref 0–200)
ABSOLUTE EOSINOPHILS: 139 CELLS/UL (ref 15–500)
ABSOLUTE LYMPHOCYTES: 3190 CELLS/UL (ref 850–3900)
ABSOLUTE MONOCYTES: 492 CELLS/UL (ref 200–950)
ABSOLUTE NEUTROPHILS: 4338 CELLS/UL (ref 1500–7800)
ALBUMIN/GLOBULIN RATIO: 1.6 (CALC) (ref 1–2.5)
ALBUMIN: 4.1 G/DL (ref 3.6–5.1)
ALKALINE PHOSPHATASE: 69 U/L (ref 31–125)
ALT: 18 U/L (ref 6–29)
AST: 20 U/L (ref 10–30)
BASOPHILS: 0.5 %
BILIRUBIN, TOTAL: 0.6 MG/DL (ref 0.2–1.2)
BUN: 14 MG/DL (ref 7–25)
CALCIUM: 9.4 MG/DL (ref 8.6–10.2)
CARBON DIOXIDE: 24 MMOL/L (ref 20–32)
CHLORIDE: 107 MMOL/L (ref 98–110)
CHOL/HDLC RATIO: 3.4 (CALC)
CHOLESTEROL, TOTAL: 139 MG/DL
CREATININE: 0.68 MG/DL (ref 0.5–1.1)
EGFR IF AFRICN AM: 132 ML/MIN/1.73M2
EGFR IF NONAFRICN AM: 114 ML/MIN/1.73M2
EOSINOPHILS: 1.7 %
GLOBULIN: 2.6 G/DL (CALC) (ref 1.9–3.7)
GLUCOSE: 73 MG/DL (ref 65–99)
HDL CHOLESTEROL: 41 MG/DL
HEMATOCRIT: 37.6 % (ref 35–45)
HEMOGLOBIN: 12.3 G/DL (ref 11.7–15.5)
LDL-CHOLESTEROL: 76 MG/DL (CALC)
LYMPHOCYTES: 38.9 %
MCH: 26.9 PG (ref 27–33)
MCHC: 32.7 G/DL (ref 32–36)
MCV: 82.3 FL (ref 80–100)
MONOCYTES: 6 %
MPV: 10.5 FL (ref 7.5–12.5)
NEUTROPHILS: 52.9 %
NON-HDL CHOLESTEROL: 98 MG/DL (CALC)
PLATELET COUNT: 401 THOUSAND/UL (ref 140–400)
POTASSIUM: 4.4 MMOL/L (ref 3.5–5.3)
PROTEIN, TOTAL: 6.7 G/DL (ref 6.1–8.1)
RDW: 13.1 % (ref 11–15)
RED BLOOD CELL COUNT: 4.57 MILLION/UL (ref 3.8–5.1)
SODIUM: 140 MMOL/L (ref 135–146)
T4, FREE: 1.3 NG/DL (ref 0.8–1.8)
TRIGLYCERIDES: 141 MG/DL
TSH: 1.32 MIU/L
WHITE BLOOD CELL COUNT: 8.2 THOUSAND/UL (ref 3.8–10.8)

## 2020-08-17 ENCOUNTER — VIRTUAL PHONE E/M (OUTPATIENT)
Dept: FAMILY MEDICINE CLINIC | Facility: CLINIC | Age: 35
End: 2020-08-17
Payer: COMMERCIAL

## 2020-08-17 ENCOUNTER — TELEPHONE (OUTPATIENT)
Dept: FAMILY MEDICINE CLINIC | Facility: CLINIC | Age: 35
End: 2020-08-17

## 2020-08-17 ENCOUNTER — HOSPITAL ENCOUNTER (EMERGENCY)
Age: 35
Discharge: HOME | End: 2020-08-17
Attending: EMERGENCY MEDICINE
Payer: COMMERCIAL

## 2020-08-17 ENCOUNTER — APPOINTMENT (OUTPATIENT)
Dept: GENERAL RADIOLOGY | Age: 35
End: 2020-08-17
Attending: EMERGENCY MEDICINE
Payer: COMMERCIAL

## 2020-08-17 VITALS
BODY MASS INDEX: 29.66 KG/M2 | SYSTOLIC BLOOD PRESSURE: 124 MMHG | RESPIRATION RATE: 18 BRPM | HEIGHT: 65 IN | DIASTOLIC BLOOD PRESSURE: 77 MMHG | OXYGEN SATURATION: 99 % | HEART RATE: 87 BPM | WEIGHT: 178 LBS | TEMPERATURE: 99 F

## 2020-08-17 DIAGNOSIS — R05.9 COUGH: Primary | ICD-10-CM

## 2020-08-17 DIAGNOSIS — B34.9 VIRAL SYNDROME: Primary | ICD-10-CM

## 2020-08-17 DIAGNOSIS — R50.9 FEVER, UNSPECIFIED FEVER CAUSE: ICD-10-CM

## 2020-08-17 DIAGNOSIS — H92.09 OTALGIA, UNSPECIFIED LATERALITY: ICD-10-CM

## 2020-08-17 DIAGNOSIS — U07.1 CLINICAL DIAGNOSIS OF COVID-19: ICD-10-CM

## 2020-08-17 LAB — SARS-COV-2 RNA RESP QL NAA+PROBE: NOT DETECTED

## 2020-08-17 PROCEDURE — 87081 CULTURE SCREEN ONLY: CPT | Performed by: EMERGENCY MEDICINE

## 2020-08-17 PROCEDURE — 99283 EMERGENCY DEPT VISIT LOW MDM: CPT

## 2020-08-17 PROCEDURE — 99213 OFFICE O/P EST LOW 20 MIN: CPT | Performed by: FAMILY MEDICINE

## 2020-08-17 PROCEDURE — 87430 STREP A AG IA: CPT | Performed by: EMERGENCY MEDICINE

## 2020-08-17 PROCEDURE — 71045 X-RAY EXAM CHEST 1 VIEW: CPT | Performed by: EMERGENCY MEDICINE

## 2020-08-17 NOTE — PROGRESS NOTES
TELEMEDICINE VISIT by phone  This visit is conducted using Telemedicine with live, interactive audio.      Verification of patient identity was established by the  patient (s)  Arin Cuevas verbally consents to a telemedicin Signs due to telemedicine visit  Physical Exam:  GEN:  Patient is alert, awake and oriented, no apparent distress. ASSESSMENT AND PLAN:      1. Cough    2. Otalgia, unspecified laterality    3.  Fever, unspecified fever cause    I would recommend the p

## 2020-08-17 NOTE — ED INITIAL ASSESSMENT (HPI)
States flu like symptoms over the weekend, temp of 101 has productive cough of green sputum. Also complains of shortness of breath, ears painful and are plugged.

## 2020-08-17 NOTE — ED PROVIDER NOTES
Patient Seen in: [facility] [place]      History   Patient presents with:  Cough/URI  Dyspnea SHAWN SOB    Stated Complaint: testing for covid    HPI    Patient is a pleasant 52-year-old female, presenting for evaluation of \"the flu muscles are intact. Pupils are equal and reactive to light. Tympanic membranes are unremarkable, bilaterally. NECK: Neck is supple and nontender. The trachea is midline. LUNGS: Lungs are clear to auscultation bilaterally, respirations are unlabored. or as discussed. Additional evaluation and intervention may be required, depending on the patient's clinical course. Patient verbalized understanding and is comfortable with the plan as recommended.   Patient ambulated freely and was subsequently discha

## 2020-08-17 NOTE — TELEPHONE ENCOUNTER
Future Appointments   Date Time Provider Tyler Olivera   8/17/2020  5:00 PM Tara Son MD [facility] [address]     Patient verbally consents to a virtual/telephone check-in service for the date and time noted above.  Patient understands and accepts

## 2020-08-18 ENCOUNTER — TELEPHONE (OUTPATIENT)
Dept: FAMILY MEDICINE CLINIC | Facility: CLINIC | Age: 35
End: 2020-08-18

## 2021-01-08 NOTE — PROGRESS NOTES
HPI:   Kenji Burks is a 29year old female that presents for complaint of having depressive episodes and anxiety episodes over the past few months.   She did see her gynecologist in September 2020 and he had prescribed Prozac which she is taking currently Rfl:     REVIEW OF SYSTEMS:     Comprehensive ROS negative unless noted in HPI    PHYSICAL EXAM:   /70   Pulse 76   Temp 98.4 °F (36.9 °C) (Oral)   Ht 5' 5\" (1.651 m)   Wt 179 lb (81.2 kg)   LMP 12/27/2020 (Exact Date)   BMI 29.79 kg/m²  Estimated b Questions and concerns addressed. Red flags to RTC or ED reviewed. Patient (or parent) agrees to plan. No follow-ups on file. Lexy Moore M.D.   Family Medicine   1/8/2021  1:25 PM

## 2021-01-11 ENCOUNTER — TELEPHONE (OUTPATIENT)
Dept: FAMILY MEDICINE CLINIC | Facility: CLINIC | Age: 36
End: 2021-01-11

## 2021-01-11 NOTE — TELEPHONE ENCOUNTER
LMOM for pt that FMLA forms ([employer]) are completed and ready for . Copies made-one sent to scan and one placed in blue accordion folder.

## 2021-02-24 ENCOUNTER — TELEMEDICINE (OUTPATIENT)
Dept: FAMILY MEDICINE CLINIC | Facility: CLINIC | Age: 36
End: 2021-02-24
Payer: COMMERCIAL

## 2021-02-24 DIAGNOSIS — R12 HEARTBURN: ICD-10-CM

## 2021-02-24 DIAGNOSIS — R10.84 GENERALIZED ABDOMINAL PAIN: Primary | ICD-10-CM

## 2021-02-24 PROCEDURE — 99215 OFFICE O/P EST HI 40 MIN: CPT | Performed by: FAMILY MEDICINE

## 2021-02-24 NOTE — PROGRESS NOTES
TELEMEDICINE VISIT by phone  This visit is conducted using Telemedicine with live, interactive video    Verification of patient identity was established by the  patient (s)  Meryl Trammell verbally consents to a telemedicine Disp: 3 ring, Rfl: 3  •  Multiple Vitamins-Minerals (MULTIVITAL-M) Oral Tab, Take by mouth., Disp: , Rfl:     REVIEW OF SYSTEMS:     Comprehensive ROS negative unless noted in HPI    PHYSICAL EXAM:   LMP 12/27/2020 (Exact Date)    Estimated body mass index

## 2021-07-16 ENCOUNTER — TELEMEDICINE (OUTPATIENT)
Dept: FAMILY MEDICINE CLINIC | Facility: CLINIC | Age: 36
End: 2021-07-16

## 2021-07-16 DIAGNOSIS — A09 TRAVELER'S DIARRHEA: ICD-10-CM

## 2021-07-16 DIAGNOSIS — J01.10 ACUTE NON-RECURRENT FRONTAL SINUSITIS: Primary | ICD-10-CM

## 2021-07-16 PROCEDURE — 99213 OFFICE O/P EST LOW 20 MIN: CPT | Performed by: FAMILY MEDICINE

## 2021-07-16 RX ORDER — AZITHROMYCIN 250 MG/1
TABLET, FILM COATED ORAL
Qty: 6 TABLET | Refills: 0 | Status: SHIPPED | OUTPATIENT
Start: 2021-07-16 | End: 2021-07-21

## 2021-07-16 NOTE — PROGRESS NOTES
Subjective    This visit is conducted using Telemedicine with live, interactive video and audio.     Chief Complaint:  Patient presents with:  Sinus Problem  Cough        The patient confirmed knowledge of the limitations of the use of telemedicine were v 12/15/2012        Years since quittin.5      Smokeless tobacco: Never Used    Vaping Use      Vaping Use: Never used    Alcohol use: Yes      Comment: 1-2x/month, 1-2 drinks    Drug use:  No              Objective    Physical Exam:  alert, appears state

## 2021-07-19 NOTE — PATIENT INSTRUCTIONS
Traveler's Diarrhea (Adult)    Traveler's diarrhea is an infection in the digestive tract that's usually caused by bacteria called E coli. This bacteria is commonly found in water supplies of developing countries.  The local people of those countries are next 24 hours, you may add the following to the above:   · Hot cereal, plain toast, bread, rolls, or crackers  · Plain noodles, rice, mashed potatoes, or chicken noodle or rice soup  · Unsweetened canned fruit (not pineapple) and bananas  · Limit how much instructions.

## 2021-12-13 ENCOUNTER — TELEPHONE (OUTPATIENT)
Dept: FAMILY MEDICINE CLINIC | Facility: CLINIC | Age: 36
End: 2021-12-13

## 2021-12-13 NOTE — TELEPHONE ENCOUNTER
Patient wanted SDA for back pain, originally started almost a year ago.  Was sitting for an extended period of time at work and started hurting again, patient can walk but can't bend or move quick, getting out of bed was a challenge, would like a referral o

## 2021-12-13 NOTE — TELEPHONE ENCOUNTER
Spoke to pt, c/o low back pain radiating into both legs. Pt states pain originally started in January and had seen chiro which helped. 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  I offered pt appt on 12/15/21 but pt decline

## 2022-01-18 RX ORDER — FLUOXETINE HYDROCHLORIDE 20 MG/1
20 CAPSULE ORAL DAILY
Qty: 30 CAPSULE | Refills: 0 | Status: SHIPPED | OUTPATIENT
Start: 2022-01-18

## 2022-01-18 NOTE — TELEPHONE ENCOUNTER
FLUOXETINE HCL 20 MG Oral Cap    [id number] - [address], [address] AT [address] [place] [address] ([address]), [phone], [phone]  _______________________________________    Future Appointments   Date Time Provider Departme

## 2022-02-19 ENCOUNTER — OFFICE VISIT (OUTPATIENT)
Dept: FAMILY MEDICINE CLINIC | Facility: CLINIC | Age: 37
End: 2022-02-19
Payer: COMMERCIAL

## 2022-02-19 VITALS
OXYGEN SATURATION: 99 % | RESPIRATION RATE: 16 BRPM | BODY MASS INDEX: 30.24 KG/M2 | DIASTOLIC BLOOD PRESSURE: 80 MMHG | SYSTOLIC BLOOD PRESSURE: 110 MMHG | WEIGHT: 181.5 LBS | HEIGHT: 65 IN | HEART RATE: 77 BPM | TEMPERATURE: 97 F

## 2022-02-19 DIAGNOSIS — Z00.00 ROUTINE ADULT HEALTH MAINTENANCE: Primary | ICD-10-CM

## 2022-02-19 PROCEDURE — 3074F SYST BP LT 130 MM HG: CPT | Performed by: FAMILY MEDICINE

## 2022-02-19 PROCEDURE — 3079F DIAST BP 80-89 MM HG: CPT | Performed by: FAMILY MEDICINE

## 2022-02-19 PROCEDURE — 99395 PREV VISIT EST AGE 18-39: CPT | Performed by: FAMILY MEDICINE

## 2022-02-19 PROCEDURE — 3008F BODY MASS INDEX DOCD: CPT | Performed by: FAMILY MEDICINE

## 2022-02-19 RX ORDER — FLUOXETINE HYDROCHLORIDE 20 MG/1
20 CAPSULE ORAL DAILY
Qty: 90 CAPSULE | Refills: 1 | Status: SHIPPED | OUTPATIENT
Start: 2022-02-19

## 2022-02-20 LAB
ABSOLUTE BASOPHILS: 56 CELLS/UL (ref 0–200)
ABSOLUTE LYMPHOCYTES: 3224 CELLS/UL (ref 850–3900)
ABSOLUTE MONOCYTES: 456 CELLS/UL (ref 200–950)
ABSOLUTE NEUTROPHILS: 4136 CELLS/UL (ref 1500–7800)
ALBUMIN/GLOBULIN RATIO: 1.5 (CALC) (ref 1–2.5)
ALBUMIN: 4.3 G/DL (ref 3.6–5.1)
ALKALINE PHOSPHATASE: 65 U/L (ref 31–125)
ALT: 18 U/L (ref 6–29)
AST: 17 U/L (ref 10–30)
BASOPHILS: 0.7 %
BILIRUBIN, TOTAL: 0.6 MG/DL (ref 0.2–1.2)
BUN: 10 MG/DL (ref 7–25)
CALCIUM: 9.8 MG/DL (ref 8.6–10.2)
CARBON DIOXIDE: 28 MMOL/L (ref 20–32)
CHOL/HDLC RATIO: 4.3 (CALC)
CHOLESTEROL, TOTAL: 200 MG/DL
CREATININE: 0.71 MG/DL (ref 0.5–1.1)
EGFR IF AFRICN AM: 127 ML/MIN/1.73M2
EGFR IF NONAFRICN AM: 110 ML/MIN/1.73M2
EOSINOPHILS: 1.6 %
GLOBULIN: 2.8 G/DL (CALC) (ref 1.9–3.7)
GLUCOSE: 83 MG/DL (ref 65–99)
HDL CHOLESTEROL: 46 MG/DL
HEMOGLOBIN: 13.5 G/DL (ref 11.7–15.5)
LDL-CHOLESTEROL: 132 MG/DL (CALC)
LYMPHOCYTES: 40.3 %
MCH: 26.6 PG (ref 27–33)
MCHC: 32.7 G/DL (ref 32–36)
MCV: 81.3 FL (ref 80–100)
MONOCYTES: 5.7 %
MPV: 10.1 FL (ref 7.5–12.5)
NEUTROPHILS: 51.7 %
NON-HDL CHOLESTEROL: 154 MG/DL (CALC)
PLATELET COUNT: 445 THOUSAND/UL (ref 140–400)
POTASSIUM: 4.4 MMOL/L (ref 3.5–5.3)
PROTEIN, TOTAL: 7.1 G/DL (ref 6.1–8.1)
RDW: 13 % (ref 11–15)
RED BLOOD CELL COUNT: 5.08 MILLION/UL (ref 3.8–5.1)
SODIUM: 140 MMOL/L (ref 135–146)
T4, FREE: 1.5 NG/DL (ref 0.8–1.8)
TRIGLYCERIDES: 112 MG/DL
TSH: 1.11 MIU/L
WHITE BLOOD CELL COUNT: 8 THOUSAND/UL (ref 3.8–10.8)

## 2022-03-16 ENCOUNTER — TELEPHONE (OUTPATIENT)
Dept: FAMILY MEDICINE CLINIC | Facility: CLINIC | Age: 37
End: 2022-03-16

## 2022-03-16 PROBLEM — F41.0 PANIC ATTACK: Status: ACTIVE | Noted: 2022-03-16

## 2022-03-16 NOTE — TELEPHONE ENCOUNTER
Patient calling, patient has noticed increased SOB during exercise. Patient does not know if it is possible anxiety, has unexplained crying spells.  Patient is unvaccinated, please advise

## 2022-03-16 NOTE — TELEPHONE ENCOUNTER
Spoke to patient. Has hx of anxiety of depression. Taking fluoxetine 20 mg daily. Has been crying uncontrollably for no reason. Had anxiety attack yesterday. SOB when talking at work for past month. Tried to work out yesterday (pushups/jumping jacks) and became SOB after 10 min. Denies SOB, chest pain, headache, dizzy currently. Patient is crying on phone and can't explain why she is upset. States no new stresses. Scheduled appt for today.    Future Appointments   Date Time Provider Tyler Olivera   3/16/2022 11:00 AM Aminata Dupree MD [facility] [address] [facility] [address]   6/14/2022  5:30 PM Chidi Dick, [address] [facility] [address] [facility]

## 2022-03-18 ENCOUNTER — TELEPHONE (OUTPATIENT)
Dept: FAMILY MEDICINE CLINIC | Facility: CLINIC | Age: 37
End: 2022-03-18

## 2022-03-18 NOTE — TELEPHONE ENCOUNTER
Received [place] paperwork from Dr. Tj Norman, physician portion completed. Pt can  forms, needs to fill out pt portion.      Copy sent to scan, copy in triage accordion folder, originals at  for pt to

## 2022-03-21 ENCOUNTER — PATIENT MESSAGE (OUTPATIENT)
Dept: FAMILY MEDICINE CLINIC | Facility: CLINIC | Age: 37
End: 2022-03-21

## 2022-04-08 NOTE — TELEPHONE ENCOUNTER
Forms filled out by Dr. Khushboo Ellsworth and have been faxed to [facility]. Confirmation received.     T[employer]ell[employer] message sent to patient

## 2022-04-20 ENCOUNTER — TELEPHONE (OUTPATIENT)
Dept: FAMILY MEDICINE CLINIC | Facility: CLINIC | Age: 37
End: 2022-04-20

## 2022-04-20 NOTE — TELEPHONE ENCOUNTER
[facility] paperwork re-faxed to [facility], [phone], confirmation received.

## 2022-05-11 RX ORDER — ALPRAZOLAM 0.5 MG/1
0.5 TABLET ORAL DAILY PRN
Qty: 10 TABLET | Refills: 0 | Status: SHIPPED | OUTPATIENT
Start: 2022-05-11

## 2022-05-11 NOTE — TELEPHONE ENCOUNTER
Requesting Alprazolam 0.5mg  LOV: 4/6/22  RTC: prn  Last Relevant Labs: 2/19/22  Filled: 3/16/22 #10 with 0 refills    Future Appointments   Date Time Provider yTler Olivera   6/14/2022  5:30 PM CHUCK Mckeon [facility]     Per Devin Utpon , last dispensed 3/16/22 #10    Rx pended and routed for approval/denial

## 2022-07-08 ENCOUNTER — HOSPITAL ENCOUNTER (EMERGENCY)
Age: 37
Discharge: HOME | End: 2022-07-08
Attending: EMERGENCY MEDICINE
Payer: COMMERCIAL

## 2022-07-08 ENCOUNTER — APPOINTMENT (OUTPATIENT)
Dept: GENERAL RADIOLOGY | Age: 37
End: 2022-07-08
Attending: EMERGENCY MEDICINE
Payer: COMMERCIAL

## 2022-07-08 VITALS
WEIGHT: 172 LBS | SYSTOLIC BLOOD PRESSURE: 109 MMHG | RESPIRATION RATE: 24 BRPM | OXYGEN SATURATION: 98 % | HEART RATE: 75 BPM | BODY MASS INDEX: 28.66 KG/M2 | DIASTOLIC BLOOD PRESSURE: 74 MMHG | TEMPERATURE: 97 F | HEIGHT: 65 IN

## 2022-07-08 DIAGNOSIS — M54.40 BACK PAIN OF LUMBAR REGION WITH SCIATICA: Primary | ICD-10-CM

## 2022-07-08 LAB
B-HCG UR QL: NEGATIVE
BILIRUB UR QL STRIP.AUTO: NEGATIVE
CLARITY UR REFRACT.AUTO: CLEAR
COLOR UR AUTO: YELLOW
GLUCOSE UR STRIP.AUTO-MCNC: NEGATIVE MG/DL
KETONES UR STRIP.AUTO-MCNC: NEGATIVE MG/DL
LEUKOCYTE ESTERASE UR QL STRIP.AUTO: NEGATIVE
NITRITE UR QL STRIP.AUTO: NEGATIVE
PH UR STRIP.AUTO: 7 [PH] (ref 5–8)
PROT UR STRIP.AUTO-MCNC: NEGATIVE MG/DL
SP GR UR STRIP.AUTO: 1.01 (ref 1–1.03)
UROBILINOGEN UR STRIP.AUTO-MCNC: 0.2 MG/DL

## 2022-07-08 PROCEDURE — 81025 URINE PREGNANCY TEST: CPT

## 2022-07-08 PROCEDURE — 99283 EMERGENCY DEPT VISIT LOW MDM: CPT

## 2022-07-08 PROCEDURE — 72100 X-RAY EXAM L-S SPINE 2/3 VWS: CPT | Performed by: EMERGENCY MEDICINE

## 2022-07-08 PROCEDURE — 81001 URINALYSIS AUTO W/SCOPE: CPT | Performed by: EMERGENCY MEDICINE

## 2022-07-08 RX ORDER — CYCLOBENZAPRINE HCL 10 MG
10 TABLET ORAL 3 TIMES DAILY PRN
Qty: 20 TABLET | Refills: 0 | Status: SHIPPED | OUTPATIENT
Start: 2022-07-08

## 2022-07-08 RX ORDER — IBUPROFEN 800 MG/1
800 TABLET ORAL EVERY 8 HOURS PRN
Qty: 30 TABLET | Refills: 0 | Status: SHIPPED | OUTPATIENT
Start: 2022-07-08 | End: 2022-07-15

## 2022-07-08 RX ORDER — PREDNISONE 20 MG/1
40 TABLET ORAL DAILY
Qty: 10 TABLET | Refills: 0 | Status: SHIPPED | OUTPATIENT
Start: 2022-07-08 | End: 2022-07-13

## 2022-07-08 NOTE — ED INITIAL ASSESSMENT (HPI)
Lower back pain for one week, always has shooting pain down right side, now left side also, denies bowel/bladder issue, back injury one year ago, no pain meds taken today, is using ice

## 2022-07-11 ENCOUNTER — OFFICE VISIT (OUTPATIENT)
Dept: FAMILY MEDICINE CLINIC | Facility: CLINIC | Age: 37
End: 2022-07-11
Payer: COMMERCIAL

## 2022-07-11 VITALS
TEMPERATURE: 97 F | DIASTOLIC BLOOD PRESSURE: 70 MMHG | RESPIRATION RATE: 16 BRPM | HEIGHT: 65 IN | SYSTOLIC BLOOD PRESSURE: 110 MMHG | BODY MASS INDEX: 29.7 KG/M2 | OXYGEN SATURATION: 98 % | HEART RATE: 68 BPM | WEIGHT: 178.25 LBS

## 2022-07-11 DIAGNOSIS — M25.551 RIGHT HIP PAIN: Primary | ICD-10-CM

## 2022-07-11 DIAGNOSIS — M79.604 LOW BACK PAIN RADIATING TO RIGHT LEG: ICD-10-CM

## 2022-07-11 DIAGNOSIS — M54.50 LOW BACK PAIN RADIATING TO RIGHT LEG: ICD-10-CM

## 2022-07-11 PROCEDURE — 3074F SYST BP LT 130 MM HG: CPT | Performed by: FAMILY MEDICINE

## 2022-07-11 PROCEDURE — 99214 OFFICE O/P EST MOD 30 MIN: CPT | Performed by: FAMILY MEDICINE

## 2022-07-11 PROCEDURE — 3078F DIAST BP <80 MM HG: CPT | Performed by: FAMILY MEDICINE

## 2022-07-11 PROCEDURE — 3008F BODY MASS INDEX DOCD: CPT | Performed by: FAMILY MEDICINE

## 2022-07-12 ENCOUNTER — HOSPITAL ENCOUNTER (OUTPATIENT)
Dept: GENERAL RADIOLOGY | Age: 37
Discharge: HOME | End: 2022-07-12
Attending: FAMILY MEDICINE
Payer: COMMERCIAL

## 2022-07-12 DIAGNOSIS — M25.551 RIGHT HIP PAIN: ICD-10-CM

## 2022-07-12 PROBLEM — R53.82 CHRONIC FATIGUE: Status: RESOLVED | Noted: 2018-10-24 | Resolved: 2022-07-12

## 2022-07-12 PROBLEM — M54.50 LOW BACK PAIN RADIATING TO RIGHT LEG: Status: ACTIVE | Noted: 2022-07-12

## 2022-07-12 PROBLEM — H92.02 LEFT EAR PAIN: Status: RESOLVED | Noted: 2019-04-23 | Resolved: 2022-07-12

## 2022-07-12 PROBLEM — M79.604 LOW BACK PAIN RADIATING TO RIGHT LEG: Status: ACTIVE | Noted: 2022-07-12

## 2022-07-12 PROCEDURE — 73502 X-RAY EXAM HIP UNI 2-3 VIEWS: CPT | Performed by: FAMILY MEDICINE

## 2022-08-15 ENCOUNTER — OFFICE VISIT (OUTPATIENT)
Dept: SURGERY | Facility: CLINIC | Age: 37
End: 2022-08-15
Payer: COMMERCIAL

## 2022-08-15 VITALS
HEIGHT: 65 IN | HEART RATE: 70 BPM | SYSTOLIC BLOOD PRESSURE: 100 MMHG | WEIGHT: 180 LBS | DIASTOLIC BLOOD PRESSURE: 72 MMHG | BODY MASS INDEX: 29.99 KG/M2

## 2022-08-15 DIAGNOSIS — M54.16 LUMBAR RADICULOPATHY: Primary | ICD-10-CM

## 2022-08-15 PROCEDURE — 3074F SYST BP LT 130 MM HG: CPT | Performed by: PHYSICIAN ASSISTANT

## 2022-08-15 PROCEDURE — 3008F BODY MASS INDEX DOCD: CPT | Performed by: PHYSICIAN ASSISTANT

## 2022-08-15 PROCEDURE — 99203 OFFICE O/P NEW LOW 30 MIN: CPT | Performed by: PHYSICIAN ASSISTANT

## 2022-08-15 PROCEDURE — 3078F DIAST BP <80 MM HG: CPT | Performed by: PHYSICIAN ASSISTANT

## 2022-08-15 RX ORDER — FLUOXETINE HYDROCHLORIDE 20 MG/1
CAPSULE ORAL
COMMUNITY
Start: 2022-04-16

## 2022-08-15 NOTE — PROGRESS NOTES
Was seen in the ED on 7.8.2022 States she has pain going down her right leg with n/t. States the pain comes and goes. Was seeing a chiropractor stop going because wasn't helping. Hurt her self lifting a desk a year ago.       Pain can get up to a 10/10      No PT   No injections   No back surgeries

## 2022-08-22 ENCOUNTER — TELEPHONE (OUTPATIENT)
Dept: SURGERY | Facility: CLINIC | Age: 37
End: 2022-08-22

## 2022-08-22 ENCOUNTER — HOSPITAL ENCOUNTER (OUTPATIENT)
Dept: MRI IMAGING | Age: 37
Discharge: HOME | End: 2022-08-22
Attending: PHYSICIAN ASSISTANT
Payer: COMMERCIAL

## 2022-08-22 DIAGNOSIS — M54.16 LUMBAR RADICULITIS: Primary | ICD-10-CM

## 2022-08-22 DIAGNOSIS — M54.16 LUMBAR RADICULOPATHY: ICD-10-CM

## 2022-08-22 PROCEDURE — 72148 MRI LUMBAR SPINE W/O DYE: CPT | Performed by: PHYSICIAN ASSISTANT

## 2022-10-10 RX ORDER — ALPRAZOLAM 0.5 MG/1
TABLET ORAL
Qty: 10 TABLET | Refills: 0 | Status: SHIPPED | OUTPATIENT
Start: 2022-10-10

## 2022-10-10 NOTE — TELEPHONE ENCOUNTER
Requesting Alprazolam 0.5mg  LOV: 7/11/22  RTC: not noted  Last Relevant Labs: 2/19/22  Filled: 5/11/22 #10 with 0 refills    No future appointments.     Rx pended and routed for approval/denial

## 2022-11-24 ENCOUNTER — HOSPITAL ENCOUNTER (EMERGENCY)
Age: 37
Discharge: HOME | End: 2022-11-24
Attending: EMERGENCY MEDICINE
Payer: COMMERCIAL

## 2022-11-24 VITALS
RESPIRATION RATE: 20 BRPM | HEART RATE: 96 BPM | SYSTOLIC BLOOD PRESSURE: 122 MMHG | WEIGHT: 180 LBS | HEIGHT: 65 IN | OXYGEN SATURATION: 99 % | BODY MASS INDEX: 29.99 KG/M2 | TEMPERATURE: 98 F | DIASTOLIC BLOOD PRESSURE: 82 MMHG

## 2022-11-24 DIAGNOSIS — U07.1 COVID-19: Primary | ICD-10-CM

## 2022-11-24 LAB
POCT INFLUENZA A: NEGATIVE
POCT INFLUENZA B: NEGATIVE
SARS-COV-2 RNA RESP QL NAA+PROBE: DETECTED

## 2022-11-24 PROCEDURE — 87502 INFLUENZA DNA AMP PROBE: CPT | Performed by: EMERGENCY MEDICINE

## 2022-11-24 PROCEDURE — 87502 INFLUENZA DNA AMP PROBE: CPT

## 2022-11-24 PROCEDURE — 99283 EMERGENCY DEPT VISIT LOW MDM: CPT

## 2022-11-24 NOTE — DISCHARGE INSTRUCTIONS
Please start taking vitamin C, vitamin D, and zinc.  Start taking an enteric-coated aspirin 325 mg daily to prevent clots which have unfortunately been common with coronavirus  Please get an incentive spirometer 1 can be obtained from your local medical supply store or even [address] [facility] [address]. Use the incentive spirometer at least 4 times daily following the instructions by breathing in the air as forcefully as possible to expand the lung fields and working to breathe and more air each time. If you feel short of breath please come to the emergency department. Please understand that coronavirus is a virus that lasts an exceptionally long period of time, most people begin to improve before day 10 however, people can become very ill even after that. Please be vigilant with your symptoms, alternate Tylenol and ibuprofen, drink plenty of fluids, give yourself time to rest.    Wearing a mask will protect you and others please continue to do so even if you tested negative for COVID-19.   There is a high false-negative rate associated with most tests so please do not go out if you are feeling ill

## 2023-01-31 RX ORDER — ALPRAZOLAM 0.5 MG/1
0.5 TABLET ORAL DAILY PRN
Qty: 10 TABLET | Refills: 0 | Status: SHIPPED | OUTPATIENT
Start: 2023-01-31

## 2023-01-31 NOTE — TELEPHONE ENCOUNTER
Requesting Alprazolam 0.5mg  LOV: 7/11/22  RTC: not noted  Last Relevant Labs: 2/19/22  Filled: 10/10/22 #10 with 0 refills    Future Appointments   Date Time Provider Tyler Olivera   4/3/2023  9:20 AM Chetan Narayan DO [facility]     Per [place] , last dispensed 10/10/22 #10    Rx pended and routed for approval/denial

## 2023-03-06 PROCEDURE — 87624 HPV HI-RISK TYP POOLED RSLT: CPT | Performed by: OBSTETRICS & GYNECOLOGY

## 2023-04-19 ENCOUNTER — TELEPHONE (OUTPATIENT)
Dept: FAMILY MEDICINE CLINIC | Facility: CLINIC | Age: 38
End: 2023-04-19

## 2023-04-19 DIAGNOSIS — Z00.00 LABORATORY EXAM ORDERED AS PART OF ROUTINE GENERAL MEDICAL EXAMINATION: Primary | ICD-10-CM

## 2023-04-19 NOTE — TELEPHONE ENCOUNTER
Future Appointments   Date Time Provider Tyler Joselin   5/3/2023  4:20 PM Cleve Desai MD [facility] [address] [facility] [address]     Patient requesting lab orders for upcoming appointment, will like to do prior to visit. Patient prefers to use [facility] labs.

## 2023-04-25 LAB
ABSOLUTE BASOPHILS: 58 CELLS/UL (ref 0–200)
ABSOLUTE EOSINOPHILS: 108 CELLS/UL (ref 15–500)
ABSOLUTE LYMPHOCYTES: 3743 CELLS/UL (ref 850–3900)
ABSOLUTE MONOCYTES: 390 CELLS/UL (ref 200–950)
ABSOLUTE NEUTROPHILS: 4001 CELLS/UL (ref 1500–7800)
ALBUMIN/GLOBULIN RATIO: 1.4 (CALC) (ref 1–2.5)
ALBUMIN: 4 G/DL (ref 3.6–5.1)
ALKALINE PHOSPHATASE: 57 U/L (ref 31–125)
ALT: 17 U/L (ref 6–29)
AST: 19 U/L (ref 10–30)
BASOPHILS: 0.7 %
BILIRUBIN, TOTAL: 0.5 MG/DL (ref 0.2–1.2)
BUN: 10 MG/DL (ref 7–25)
CALCIUM: 9.4 MG/DL (ref 8.6–10.2)
CARBON DIOXIDE: 26 MMOL/L (ref 20–32)
CHLORIDE: 105 MMOL/L (ref 98–110)
CHOL/HDLC RATIO: 3.3 (CALC)
CHOLESTEROL, TOTAL: 154 MG/DL
CREATININE: 0.7 MG/DL (ref 0.5–0.97)
EGFR: 114 ML/MIN/1.73M2
EOSINOPHILS: 1.3 %
GLOBULIN: 2.9 G/DL (CALC) (ref 1.9–3.7)
GLUCOSE: 79 MG/DL (ref 65–99)
HDL CHOLESTEROL: 47 MG/DL
HEMATOCRIT: 39.4 % (ref 35–45)
HEMOGLOBIN: 13 G/DL (ref 11.7–15.5)
LDL-CHOLESTEROL: 80 MG/DL (CALC)
LYMPHOCYTES: 45.1 %
MCH: 27.3 PG (ref 27–33)
MCHC: 33 G/DL (ref 32–36)
MCV: 82.8 FL (ref 80–100)
MONOCYTES: 4.7 %
MPV: 10.4 FL (ref 7.5–12.5)
NEUTROPHILS: 48.2 %
NON-HDL CHOLESTEROL: 107 MG/DL (CALC)
PLATELET COUNT: 429 THOUSAND/UL (ref 140–400)
POTASSIUM: 4.4 MMOL/L (ref 3.5–5.3)
PROTEIN, TOTAL: 6.9 G/DL (ref 6.1–8.1)
RDW: 12.7 % (ref 11–15)
RED BLOOD CELL COUNT: 4.76 MILLION/UL (ref 3.8–5.1)
SODIUM: 139 MMOL/L (ref 135–146)
TRIGLYCERIDES: 178 MG/DL
TSH W/REFLEX TO FT4: 1.07 MIU/L
WHITE BLOOD CELL COUNT: 8.3 THOUSAND/UL (ref 3.8–10.8)

## 2023-05-03 ENCOUNTER — OFFICE VISIT (OUTPATIENT)
Dept: FAMILY MEDICINE CLINIC | Facility: CLINIC | Age: 38
End: 2023-05-03
Payer: COMMERCIAL

## 2023-05-03 VITALS
TEMPERATURE: 97 F | DIASTOLIC BLOOD PRESSURE: 70 MMHG | BODY MASS INDEX: 30.74 KG/M2 | HEIGHT: 65 IN | SYSTOLIC BLOOD PRESSURE: 118 MMHG | RESPIRATION RATE: 16 BRPM | OXYGEN SATURATION: 99 % | HEART RATE: 57 BPM | WEIGHT: 184.5 LBS

## 2023-05-03 DIAGNOSIS — R79.89 ELEVATED PLATELET COUNT: ICD-10-CM

## 2023-05-03 DIAGNOSIS — Z00.00 ROUTINE ADULT HEALTH MAINTENANCE: Primary | ICD-10-CM

## 2023-05-03 DIAGNOSIS — H93.8X2 EAR POPPING, LEFT: ICD-10-CM

## 2023-05-03 DIAGNOSIS — R10.9 ABDOMINAL PAIN, UNSPECIFIED ABDOMINAL LOCATION: ICD-10-CM

## 2023-05-03 PROCEDURE — 99395 PREV VISIT EST AGE 18-39: CPT | Performed by: FAMILY MEDICINE

## 2023-05-03 PROCEDURE — 3078F DIAST BP <80 MM HG: CPT | Performed by: FAMILY MEDICINE

## 2023-05-03 PROCEDURE — 3074F SYST BP LT 130 MM HG: CPT | Performed by: FAMILY MEDICINE

## 2023-05-03 PROCEDURE — 3008F BODY MASS INDEX DOCD: CPT | Performed by: FAMILY MEDICINE

## 2023-05-03 PROCEDURE — 99214 OFFICE O/P EST MOD 30 MIN: CPT | Performed by: FAMILY MEDICINE

## 2023-05-03 RX ORDER — ALPRAZOLAM 0.5 MG/1
0.5 TABLET ORAL DAILY PRN
Qty: 10 TABLET | Refills: 0 | Status: SHIPPED | OUTPATIENT
Start: 2023-05-03

## 2023-05-03 RX ORDER — VALACYCLOVIR HYDROCHLORIDE 1 G/1
1000 TABLET, FILM COATED ORAL 3 TIMES DAILY
Qty: 21 TABLET | Refills: 0 | Status: SHIPPED | OUTPATIENT
Start: 2023-05-03 | End: 2023-05-10

## 2023-05-04 ENCOUNTER — TELEPHONE (OUTPATIENT)
Dept: FAMILY MEDICINE CLINIC | Facility: CLINIC | Age: 38
End: 2023-05-04

## 2023-05-04 DIAGNOSIS — R10.9 ABDOMINAL PAIN, UNSPECIFIED ABDOMINAL LOCATION: Primary | ICD-10-CM

## 2023-05-04 NOTE — TELEPHONE ENCOUNTER
Patient calling, patient requesting order for abdominal ultrasound. Patient states was discussed during visit yesterday.  Please advise

## 2023-05-05 ENCOUNTER — PATIENT MESSAGE (OUTPATIENT)
Dept: FAMILY MEDICINE CLINIC | Facility: CLINIC | Age: 38
End: 2023-05-05

## 2023-05-05 NOTE — TELEPHONE ENCOUNTER
From: Aimee Quintero  To: Army Ronal MD  Sent: 5/5/2023 2:18 PM CDT  Subject: US ABDOMEN COMPLETE (CPT=76700)    Good afternoon, for this test do I need to schedule an appointment?

## 2023-06-01 ENCOUNTER — HOSPITAL ENCOUNTER (EMERGENCY)
Age: 38
Discharge: HOME | End: 2023-06-01
Attending: EMERGENCY MEDICINE
Payer: COMMERCIAL

## 2023-06-01 ENCOUNTER — APPOINTMENT (OUTPATIENT)
Dept: ULTRASOUND IMAGING | Age: 38
End: 2023-06-01
Attending: EMERGENCY MEDICINE
Payer: COMMERCIAL

## 2023-06-01 VITALS
HEART RATE: 86 BPM | DIASTOLIC BLOOD PRESSURE: 71 MMHG | OXYGEN SATURATION: 97 % | SYSTOLIC BLOOD PRESSURE: 111 MMHG | WEIGHT: 180 LBS | RESPIRATION RATE: 16 BRPM | BODY MASS INDEX: 29.99 KG/M2 | HEIGHT: 65 IN | TEMPERATURE: 98 F

## 2023-06-01 DIAGNOSIS — K21.9 GASTROESOPHAGEAL REFLUX DISEASE, UNSPECIFIED WHETHER ESOPHAGITIS PRESENT: Primary | ICD-10-CM

## 2023-06-01 DIAGNOSIS — R10.13 EPIGASTRIC PAIN: ICD-10-CM

## 2023-06-01 LAB
ALBUMIN SERPL-MCNC: 3.2 G/DL (ref 3.4–5)
ALBUMIN/GLOB SERPL: 0.8 {RATIO} (ref 1–2)
ALP LIVER SERPL-CCNC: 59 U/L
ALT SERPL-CCNC: 32 U/L
ANION GAP SERPL CALC-SCNC: 3 MMOL/L (ref 0–18)
AST SERPL-CCNC: 46 U/L (ref 15–37)
B-HCG UR QL: NEGATIVE
BASOPHILS # BLD AUTO: 0.05 X10(3) UL (ref 0–0.2)
BASOPHILS NFR BLD AUTO: 0.7 %
BILIRUB SERPL-MCNC: 0.2 MG/DL (ref 0.1–2)
BILIRUB UR QL STRIP.AUTO: NEGATIVE
BUN BLD-MCNC: 9 MG/DL (ref 7–18)
CALCIUM BLD-MCNC: 8.6 MG/DL (ref 8.5–10.1)
CHLORIDE SERPL-SCNC: 109 MMOL/L (ref 98–112)
CLARITY UR REFRACT.AUTO: CLEAR
CO2 SERPL-SCNC: 26 MMOL/L (ref 21–32)
COLOR UR AUTO: YELLOW
CREAT BLD-MCNC: 0.7 MG/DL
EOSINOPHIL # BLD AUTO: 0.12 X10(3) UL (ref 0–0.7)
EOSINOPHIL NFR BLD AUTO: 1.8 %
ERYTHROCYTE [DISTWIDTH] IN BLOOD BY AUTOMATED COUNT: 13.2 %
GFR SERPLBLD BASED ON 1.73 SQ M-ARVRAT: 114 ML/MIN/1.73M2 (ref 60–?)
GLOBULIN PLAS-MCNC: 4.1 G/DL (ref 2.8–4.4)
GLUCOSE BLD-MCNC: 84 MG/DL (ref 70–99)
GLUCOSE UR STRIP.AUTO-MCNC: NEGATIVE MG/DL
HCT VFR BLD AUTO: 40.1 %
HGB BLD-MCNC: 13 G/DL
IMM GRANULOCYTES # BLD AUTO: 0.02 X10(3) UL (ref 0–1)
IMM GRANULOCYTES NFR BLD: 0.3 %
KETONES UR STRIP.AUTO-MCNC: NEGATIVE MG/DL
LEUKOCYTE ESTERASE UR QL STRIP.AUTO: NEGATIVE
LIPASE SERPL-CCNC: 49 U/L (ref 13–75)
LYMPHOCYTES # BLD AUTO: 2.56 X10(3) UL (ref 1–4)
LYMPHOCYTES NFR BLD AUTO: 38.2 %
MCH RBC QN AUTO: 27.4 PG (ref 26–34)
MCHC RBC AUTO-ENTMCNC: 32.4 G/DL (ref 31–37)
MCV RBC AUTO: 84.4 FL
MONOCYTES # BLD AUTO: 0.47 X10(3) UL (ref 0.1–1)
MONOCYTES NFR BLD AUTO: 7 %
NEUTROPHILS # BLD AUTO: 3.49 X10 (3) UL (ref 1.5–7.7)
NEUTROPHILS # BLD AUTO: 3.49 X10(3) UL (ref 1.5–7.7)
NEUTROPHILS NFR BLD AUTO: 52 %
NITRITE UR QL STRIP.AUTO: NEGATIVE
OSMOLALITY SERPL CALC.SUM OF ELEC: 284 MOSM/KG (ref 275–295)
PH UR STRIP.AUTO: 8.5 [PH] (ref 5–8)
PLATELET # BLD AUTO: 414 10(3)UL (ref 150–450)
POTASSIUM SERPL-SCNC: 4.8 MMOL/L (ref 3.5–5.1)
PROT SERPL-MCNC: 7.3 G/DL (ref 6.4–8.2)
PROT UR STRIP.AUTO-MCNC: NEGATIVE MG/DL
RBC # BLD AUTO: 4.75 X10(6)UL
RBC #/AREA URNS AUTO: >10 /HPF
SODIUM SERPL-SCNC: 138 MMOL/L (ref 136–145)
SP GR UR STRIP.AUTO: 1.02 (ref 1–1.03)
UROBILINOGEN UR STRIP.AUTO-MCNC: 0.2 MG/DL
WBC # BLD AUTO: 6.7 X10(3) UL (ref 4–11)

## 2023-06-01 PROCEDURE — 96374 THER/PROPH/DIAG INJ IV PUSH: CPT

## 2023-06-01 PROCEDURE — 99284 EMERGENCY DEPT VISIT MOD MDM: CPT

## 2023-06-01 PROCEDURE — 81025 URINE PREGNANCY TEST: CPT

## 2023-06-01 PROCEDURE — 96361 HYDRATE IV INFUSION ADD-ON: CPT

## 2023-06-01 PROCEDURE — 81015 MICROSCOPIC EXAM OF URINE: CPT | Performed by: EMERGENCY MEDICINE

## 2023-06-01 PROCEDURE — 81001 URINALYSIS AUTO W/SCOPE: CPT | Performed by: EMERGENCY MEDICINE

## 2023-06-01 PROCEDURE — 99285 EMERGENCY DEPT VISIT HI MDM: CPT

## 2023-06-01 PROCEDURE — 80053 COMPREHEN METABOLIC PANEL: CPT | Performed by: EMERGENCY MEDICINE

## 2023-06-01 PROCEDURE — 83690 ASSAY OF LIPASE: CPT | Performed by: EMERGENCY MEDICINE

## 2023-06-01 PROCEDURE — 76700 US EXAM ABDOM COMPLETE: CPT | Performed by: EMERGENCY MEDICINE

## 2023-06-01 PROCEDURE — 85025 COMPLETE CBC W/AUTO DIFF WBC: CPT | Performed by: EMERGENCY MEDICINE

## 2023-06-01 PROCEDURE — S0028 INJECTION, FAMOTIDINE, 20 MG: HCPCS | Performed by: EMERGENCY MEDICINE

## 2023-06-01 RX ORDER — SUCRALFATE ORAL 1 G/10ML
1 SUSPENSION ORAL
Qty: 280 ML | Refills: 0 | Status: SHIPPED | OUTPATIENT
Start: 2023-06-01 | End: 2023-06-08

## 2023-06-01 RX ORDER — FAMOTIDINE 10 MG/ML
20 INJECTION, SOLUTION INTRAVENOUS ONCE
Status: COMPLETED | OUTPATIENT
Start: 2023-06-01 | End: 2023-06-01

## 2023-06-01 RX ORDER — MAGNESIUM HYDROXIDE/ALUMINUM HYDROXICE/SIMETHICONE 120; 1200; 1200 MG/30ML; MG/30ML; MG/30ML
30 SUSPENSION ORAL ONCE
Status: COMPLETED | OUTPATIENT
Start: 2023-06-01 | End: 2023-06-01

## 2023-06-01 RX ORDER — LIDOCAINE HYDROCHLORIDE 20 MG/ML
10 SOLUTION OROPHARYNGEAL ONCE
Status: DISCONTINUED | OUTPATIENT
Start: 2023-06-01 | End: 2023-06-01

## 2023-06-01 RX ORDER — OMEPRAZOLE 40 MG/1
40 CAPSULE, DELAYED RELEASE ORAL DAILY
Qty: 30 CAPSULE | Refills: 0 | Status: SHIPPED | OUTPATIENT
Start: 2023-06-01 | End: 2023-07-01

## 2023-06-05 ENCOUNTER — OFFICE VISIT (OUTPATIENT)
Dept: HEMATOLOGY/ONCOLOGY | Age: 38
End: 2023-06-05
Attending: INTERNAL MEDICINE
Payer: COMMERCIAL

## 2023-06-05 VITALS
BODY MASS INDEX: 30 KG/M2 | DIASTOLIC BLOOD PRESSURE: 76 MMHG | OXYGEN SATURATION: 99 % | RESPIRATION RATE: 18 BRPM | TEMPERATURE: 98 F | SYSTOLIC BLOOD PRESSURE: 119 MMHG | WEIGHT: 179.38 LBS | HEART RATE: 80 BPM

## 2023-06-05 DIAGNOSIS — R79.89 ELEVATED PLATELET COUNT: Primary | ICD-10-CM

## 2023-06-05 LAB
DEPRECATED HBV CORE AB SER IA-ACNC: 62.5 NG/ML
IRON SATN MFR SERPL: 20 %
IRON SERPL-MCNC: 81 UG/DL
TIBC SERPL-MCNC: 414 UG/DL (ref 240–450)
TRANSFERRIN SERPL-MCNC: 278 MG/DL (ref 200–360)

## 2023-06-05 PROCEDURE — 83550 IRON BINDING TEST: CPT | Performed by: INTERNAL MEDICINE

## 2023-06-05 PROCEDURE — 99211 OFF/OP EST MAY X REQ PHY/QHP: CPT

## 2023-06-05 PROCEDURE — 36415 COLL VENOUS BLD VENIPUNCTURE: CPT

## 2023-06-05 PROCEDURE — 82728 ASSAY OF FERRITIN: CPT | Performed by: INTERNAL MEDICINE

## 2023-06-05 PROCEDURE — 83540 ASSAY OF IRON: CPT | Performed by: INTERNAL MEDICINE

## 2023-09-05 RX ORDER — ALPRAZOLAM 0.5 MG/1
0.5 TABLET ORAL
Qty: 10 TABLET | Refills: 0 | Status: SHIPPED | OUTPATIENT
Start: 2023-09-05

## 2023-09-05 NOTE — TELEPHONE ENCOUNTER
Requesting Alprazolam 0.5mg  LOV: 5/3/23 Physical  RTC: 1 year  Last Relevant Labs: 4/24/23  Filled: 5/3/23 #10 with 0 refills    No future appointments.     Per [place] , last dispensed 5/3/23 #10    Rx pended and routed for approval/denial

## 2024-01-09 NOTE — TELEPHONE ENCOUNTER
Requesting Alprazolam 0.5mg  Last OV: 5/3/23 Physical  RTC: 1 year  Last Rx'd 9/5/23 #10 with 0 refills    No future appointments.    Per [place] , last dispensed 9/5/23 #10    Non-protocol med:  Rx pended and routed for approval/denial

## 2024-01-22 PROCEDURE — 87491 CHLMYD TRACH DNA AMP PROBE: CPT | Performed by: OBSTETRICS & GYNECOLOGY

## 2024-01-22 PROCEDURE — 87591 N.GONORRHOEAE DNA AMP PROB: CPT | Performed by: OBSTETRICS & GYNECOLOGY

## 2024-03-11 ENCOUNTER — TELEPHONE (OUTPATIENT)
Dept: FAMILY MEDICINE CLINIC | Facility: CLINIC | Age: 39
End: 2024-03-11

## 2024-03-11 DIAGNOSIS — Z00.00 LABORATORY EXAMINATION ORDERED AS PART OF A ROUTINE GENERAL MEDICAL EXAMINATION: Primary | ICD-10-CM

## 2024-03-11 NOTE — TELEPHONE ENCOUNTER
Pt would like lab orders placed for her upcoming CPE.  Future Appointments   Date Time Provider Department Center   5/8/2024  9:40 AM Jose Roberto Vinson MD [facility] [address] [facility] [address]

## 2024-04-13 DIAGNOSIS — F41.0 PANIC ATTACK: ICD-10-CM

## 2024-04-15 RX ORDER — ALPRAZOLAM 0.5 MG/1
0.5 TABLET ORAL
Qty: 10 TABLET | Refills: 0 | Status: SHIPPED | OUTPATIENT
Start: 2024-04-15

## 2024-04-15 NOTE — TELEPHONE ENCOUNTER
Requesting Alprazolam 0.5mg  Last OV: 5/3/23 Physical  RTC: 1 year  Last Rx'd 1/9/24 #10 with 0 refills    Future Appointments   Date Time Provider Department Center   5/8/2024  9:40 AM Jose Roberto Vinson MD [facility] [address] [facility] [address]     Per [place] , last dispensed 1/9/24 #10    Controlled med:  Rx pended and routed for approval/denial

## 2024-05-05 LAB
ABSOLUTE BASOPHILS: 69 CELLS/UL (ref 0–200)
ABSOLUTE EOSINOPHILS: 131 CELLS/UL (ref 15–500)
ABSOLUTE LYMPHOCYTES: 2757 CELLS/UL (ref 850–3900)
ABSOLUTE MONOCYTES: 362 CELLS/UL (ref 200–950)
ABSOLUTE NEUTROPHILS: 4381 CELLS/UL (ref 1500–7800)
ALBUMIN/GLOBULIN RATIO: 1.7 (CALC) (ref 1–2.5)
ALBUMIN: 4.3 G/DL (ref 3.6–5.1)
ALKALINE PHOSPHATASE: 64 U/L (ref 31–125)
ALT: 14 U/L (ref 6–29)
AST: 15 U/L (ref 10–30)
BASOPHILS: 0.9 %
BILIRUBIN, TOTAL: 0.3 MG/DL (ref 0.2–1.2)
BUN: 8 MG/DL (ref 7–25)
CALCIUM: 9.7 MG/DL (ref 8.6–10.2)
CARBON DIOXIDE: 24 MMOL/L (ref 20–32)
CHLORIDE: 109 MMOL/L (ref 98–110)
CHOL/HDLC RATIO: 3.8 (CALC)
CHOLESTEROL, TOTAL: 163 MG/DL
CREATININE: 0.75 MG/DL (ref 0.5–0.97)
EGFR: 104 ML/MIN/1.73M2
EOSINOPHILS: 1.7 %
GLOBULIN: 2.6 G/DL (CALC) (ref 1.9–3.7)
GLUCOSE: 80 MG/DL (ref 65–99)
HDL CHOLESTEROL: 43 MG/DL
HEMATOCRIT: 42.8 % (ref 35–45)
HEMOGLOBIN: 13.7 G/DL (ref 11.7–15.5)
LDL-CHOLESTEROL: 97 MG/DL (CALC)
LYMPHOCYTES: 35.8 %
MCH: 26.8 PG (ref 27–33)
MCHC: 32 G/DL (ref 32–36)
MCV: 83.8 FL (ref 80–100)
MONOCYTES: 4.7 %
MPV: 10.4 FL (ref 7.5–12.5)
NEUTROPHILS: 56.9 %
NON-HDL CHOLESTEROL: 120 MG/DL (CALC)
PLATELET COUNT: 472 THOUSAND/UL (ref 140–400)
POTASSIUM: 5.4 MMOL/L (ref 3.5–5.3)
PROTEIN, TOTAL: 6.9 G/DL (ref 6.1–8.1)
RDW: 12.6 % (ref 11–15)
RED BLOOD CELL COUNT: 5.11 MILLION/UL (ref 3.8–5.1)
SODIUM: 141 MMOL/L (ref 135–146)
TRIGLYCERIDES: 131 MG/DL
TSH W/REFLEX TO FT4: 0.5 MIU/L
WHITE BLOOD CELL COUNT: 7.7 THOUSAND/UL (ref 3.8–10.8)

## 2024-05-08 ENCOUNTER — OFFICE VISIT (OUTPATIENT)
Dept: FAMILY MEDICINE CLINIC | Facility: CLINIC | Age: 39
End: 2024-05-08
Payer: COMMERCIAL

## 2024-05-08 VITALS
DIASTOLIC BLOOD PRESSURE: 78 MMHG | TEMPERATURE: 98 F | HEART RATE: 96 BPM | OXYGEN SATURATION: 99 % | HEIGHT: 65 IN | BODY MASS INDEX: 29.16 KG/M2 | SYSTOLIC BLOOD PRESSURE: 102 MMHG | RESPIRATION RATE: 14 BRPM | WEIGHT: 175 LBS

## 2024-05-08 DIAGNOSIS — Z00.00 ROUTINE ADULT HEALTH MAINTENANCE: ICD-10-CM

## 2024-05-08 DIAGNOSIS — Z83.49 FAMILY HISTORY OF PITUITARY DISEASE: Primary | ICD-10-CM

## 2024-05-08 PROCEDURE — 3078F DIAST BP <80 MM HG: CPT | Performed by: FAMILY MEDICINE

## 2024-05-08 PROCEDURE — 99395 PREV VISIT EST AGE 18-39: CPT | Performed by: FAMILY MEDICINE

## 2024-05-08 PROCEDURE — 3008F BODY MASS INDEX DOCD: CPT | Performed by: FAMILY MEDICINE

## 2024-05-08 PROCEDURE — 3074F SYST BP LT 130 MM HG: CPT | Performed by: FAMILY MEDICINE

## 2024-05-08 RX ORDER — OMEPRAZOLE 40 MG/1
40 CAPSULE, DELAYED RELEASE ORAL
COMMUNITY
Start: 2023-06-02

## 2024-05-08 RX ORDER — NORTRIPTYLINE HYDROCHLORIDE 25 MG/1
25 CAPSULE ORAL NIGHTLY
COMMUNITY
Start: 2024-03-18

## 2024-05-08 NOTE — PROGRESS NOTES
HPI:   Austyn Lowe is a 39 year old female who presents for a complete physical exam.    Patient has no complaints today.  Father had pituitary tumor. Pt would like prolactin level.   Patient had at age 18, leaking breast milk from breasts.      Wt Readings from Last 6 Encounters:   05/08/24 175 lb (79.4 kg)   02/07/24 183 lb (83 kg)   01/22/24 186 lb (84.4 kg)   06/05/23 179 lb 6.4 oz (81.4 kg)   06/01/23 180 lb (81.6 kg)   05/03/23 184 lb 8 oz (83.7 kg)     Body mass index is 29.12 kg/m².       Results for orders placed or performed in visit on 03/11/24   TSH W REFLEX TO FREE T4 [89781][Q]   Result Value Ref Range    TSH W/REFLEX TO FT4 0.50 mIU/L   LIPID PANEL [5690] [Q]   Result Value Ref Range    CHOLESTEROL, TOTAL 163 <200 mg/dL    HDL CHOLESTEROL 43 (L) > OR = 50 mg/dL    TRIGLYCERIDES 131 <150 mg/dL    LDL-CHOLESTEROL 97 mg/dL (calc)    CHOL/HDLC RATIO 3.8 <5.0 (calc)    NON-HDL CHOLESTEROL 120 <130 mg/dL (calc)   CBC [6399] [Q]   Result Value Ref Range    WHITE BLOOD CELL COUNT 7.7 3.8 - 10.8 Thousand/uL    RED BLOOD CELL COUNT 5.11 (H) 3.80 - 5.10 Million/uL    HEMOGLOBIN 13.7 11.7 - 15.5 g/dL    HEMATOCRIT 42.8 35.0 - 45.0 %    MCV 83.8 80.0 - 100.0 fL    MCH 26.8 (L) 27.0 - 33.0 pg    MCHC 32.0 32.0 - 36.0 g/dL    RDW 12.6 11.0 - 15.0 %    PLATELET COUNT 472 (H) 140 - 400 Thousand/uL    MPV 10.4 7.5 - 12.5 fL    ABSOLUTE NEUTROPHILS 4,381 1,500 - 7,800 cells/uL    ABSOLUTE LYMPHOCYTES 2,757 850 - 3,900 cells/uL    ABSOLUTE MONOCYTES 362 200 - 950 cells/uL    ABSOLUTE EOSINOPHILS 131 15 - 500 cells/uL    ABSOLUTE BASOPHILS 69 0 - 200 cells/uL    NEUTROPHILS 56.9 %    LYMPHOCYTES 35.8 %    MONOCYTES 4.7 %    EOSINOPHILS 1.7 %    BASOPHILS 0.9 %   Comp Metabolic Panel (14)   Result Value Ref Range    GLUCOSE 80 65 - 99 mg/dL    UREA NITROGEN (BUN) 8 7 - 25 mg/dL    CREATININE 0.75 0.50 - 0.97 mg/dL    EGFR 104 > OR = 60 mL/min/1.73m2    BUN/CREATININE RATIO SEE NOTE: 6 - 22 (calc)    SODIUM 141 135 - 146  mmol/L    POTASSIUM 5.4 (H) 3.5 - 5.3 mmol/L    CHLORIDE 109 98 - 110 mmol/L    CARBON DIOXIDE 24 20 - 32 mmol/L    CALCIUM 9.7 8.6 - 10.2 mg/dL    PROTEIN, TOTAL 6.9 6.1 - 8.1 g/dL    ALBUMIN 4.3 3.6 - 5.1 g/dL    GLOBULIN 2.6 1.9 - 3.7 g/dL (calc)    ALBUMIN/GLOBULIN RATIO 1.7 1.0 - 2.5 (calc)    BILIRUBIN, TOTAL 0.3 0.2 - 1.2 mg/dL    ALKALINE PHOSPHATASE 64 31 - 125 U/L    AST 15 10 - 30 U/L    ALT 14 6 - 29 U/L        Current Outpatient Medications   Medication Sig Dispense Refill    Omeprazole 40 MG Oral Capsule Delayed Release Take 1 capsule (40 mg total) by mouth every morning before breakfast.      nortriptyline 25 MG Oral Cap Take 1 capsule (25 mg total) by mouth nightly.      ALPRAZolam 0.5 MG Oral Tab Take 1 tablet (0.5 mg total) by mouth daily as needed. 10 tablet 0    Levonorgest-Eth Estrad 91-Day 0.1-0.02 & 0.01 MG Oral Tab Take 1 tablet by mouth daily. 91 tablet 2    Multiple Vitamins-Minerals (MULTIVITAL-M) Oral Tab Take by mouth.        Past Medical History:    Back pain    Obesity (BMI 30-39.9)      History reviewed. No pertinent surgical history.   Family History   Problem Relation Age of Onset    No Known Problems Father     No Known Problems Mother     Cancer Other         No family hx Breast/Ovarian/Colon Ca    Other (Other) Brother         thyroid disorder    No Known Problems Brother     No Known Problems Brother       Social History:   Social History     Socioeconomic History    Marital status: Single   Tobacco Use    Smoking status: Former     Current packs/day: 0.00     Average packs/day: 3.0 packs/day for 4.0 years (12.0 ttl pk-yrs)     Types: Cigarettes     Start date: 12/15/2008     Quit date: 12/15/2012     Years since quittin.4    Smokeless tobacco: Never   Vaping Use    Vaping status: Never Used   Substance and Sexual Activity    Alcohol use: Yes     Comment: 1-2x/month, 1-2 drinks    Drug use: No    Sexual activity: Not Currently     Partners: Male        REVIEW OF SYSTEMS:    GENERAL: denies fevers, weakness, trouble sleeping or weight changes  SKIN: denies any unusual skin lesions or rashes  EYES:denies vision changes  HEENT: denies upper respiratory symptoms  LUNGS: denies cough or shortness of breath with exertion  CHEST:  denies breast changes or pain  CARDIOVASCULAR: denies chest pain or tightness on exertion: no edema  VASCULAR: denies leg cramps  GI: denies abdominal pain, bowel movement changes, blood in stool  : denies urinary problems, vaginal discharge or discomfort   MUSCULOSKELETAL: denies joint pain or stiffness  NEURO: denies headaches, tingling or dizziness  PSYCHE: denies depression or anxiety  HEMATOLOGIC: denies bleeding abnormalities  ENDOCRINE: denies temperature intolerance, polyuria, or excessive sweating.  LYMPHATICS: denies swollen glands      EXAM:   /78   Pulse 96   Temp 97.5 °F (36.4 °C) (Temporal)   Resp 14   Ht 5' 5\" (1.651 m)   Wt 175 lb (79.4 kg)   LMP 01/08/2024 (Exact Date)   SpO2 99%   BMI 29.12 kg/m²   Body mass index is 29.12 kg/m².   GENERAL: well developed, well nourished and in no apparent distress  SKIN: no rashes,no suspicious lesions  HEENT: atraumatic, normocephalic,ears, nose and throat are normal  EYES: PERRLA, EOMI, sclera, conjunctiva are clear  NECK: supple,no adenopathy,no carotid bruits  CHEST: no chest tenderness  LUNGS: clear to auscultation bilateral, no rales, rhonchi or wheezing  CARDIO: S1S2 Normal sinus rhythm, no murmur, gallops, or rubs   ABD:  normal bowel sounds,soft, non tender, no masses, HSM or tenderness  MUSCULOSKELETAL: gait is normal, motor 5/5 all 4 extremities,   EXTREMITIES: no clubbing, cyanosis, or edema  NEURO: alert and oriented x 3, cranial nerves are grossly intact, no gross motor or sensory deficit.    ASSESSMENT AND PLAN:   Austyn Lowe is a 39 year old female who presents for a complete physical exam.    Austyn was seen today for physical and immunization/injection.    Diagnoses and all  orders for this visit:    Family history of pituitary disease  -     Endocrine Referral - In Network    Routine adult health maintenance        Patient has family history of pituitary disease and I would recommend that she get opinion from endocrinologist and she would like blood testing done for herself.  She also has other symptoms including depression and anxiety migraine headaches and is wanting to know if it is related to any hormonal issues.  Lab work was reviewed and everything looks good.    Pap and pelvic deferred to her gynecologist.      Pt educated on immunizations and health maintenance appropriate for age.     The patient verbalizes understanding of these recommendations and agrees to the plan.    The patient is asked to return for complete physical yearly.      There are no Patient Instructions on file for this visit.    No orders of the defined types were placed in this encounter.

## 2024-05-09 ENCOUNTER — TELEPHONE (OUTPATIENT)
Dept: FAMILY MEDICINE CLINIC | Facility: CLINIC | Age: 39
End: 2024-05-09

## 2024-05-09 NOTE — TELEPHONE ENCOUNTER
Patient calling in stating that she is not able to view her AVS or referrals on her MC.     Please submit referrals for patient to view.

## 2024-05-17 NOTE — PROGRESS NOTES
[facility] Note    Visit Diagnosis:  1. Elevated platelet count        History of Present Illness: 39F referred by Dr. Vinson to discuss abnormal labs.    -Since 2018, she had intermittent platelet counts > 400 but never > 450. She has no B symptoms. No history of bleeding, bruising or blood clots. No smoking or ETOH abuse. She has had some GI issues recently and her US abdomen was unremarkable. She has follow up with GI. No family history of blood disorders. Fe studies from June 2023 normal.    -05/2024: WBC 7.2, Hb 13.7, Plt 472    Since her last visit, no new symptoms. She is mildly fatigued. She met with GI and had a CT AP which was unremarkable. EGD/Colon from 08/2023 were unremarkable-following with Dr. Patricio Watson. Menses are fairly light.     Review of Systems: 12 Point ROS was completed and pertinent positives are in the HPI    Current Outpatient Medications on File Prior to Visit   Medication Sig Dispense Refill    Omeprazole 40 MG Oral Capsule Delayed Release Take 1 capsule (40 mg total) by mouth every morning before breakfast.      nortriptyline 25 MG Oral Cap Take 1 capsule (25 mg total) by mouth nightly.      Levonorgest-Eth Estrad 91-Day 0.1-0.02 & 0.01 MG Oral Tab Take 1 tablet by mouth daily. 91 tablet 2    Multiple Vitamins-Minerals (MULTIVITAL-M) Oral Tab Take by mouth.      ALPRAZolam 0.5 MG Oral Tab Take 1 tablet (0.5 mg total) by mouth daily as needed. (Patient not taking: Reported on 5/20/2024) 10 tablet 0     No current facility-administered medications on file prior to visit.     Past Medical History:    Back pain    Obesity (BMI 30-39.9)     History reviewed. No pertinent surgical history.  Social History     Socioeconomic History    Marital status: Single   Tobacco Use    Smoking status: Former     Current packs/day: 0.00     Average packs/day: 3.0 packs/day for 4.0 years (12.0 ttl pk-yrs)     Types: Cigarettes     Start date: 12/15/2008     Quit date: 12/15/2012      Years since quittin.4    Smokeless tobacco: Never   Vaping Use    Vaping status: Never Used   Substance and Sexual Activity    Alcohol use: Yes     Comment: 1-2x/month, 1-2 drinks    Drug use: No    Sexual activity: Not Currently     Partners: Male      Family History   Problem Relation Age of Onset    No Known Problems Father     No Known Problems Mother     Cancer Other         No family hx Breast/Ovarian/Colon Ca    Other (Other) Brother         thyroid disorder    No Known Problems Brother     No Known Problems Brother        Physical Exam  Height: --  Weight: 79.8 kg (176 lb) (1425)  BSA (Calculated - sq m): --  Pulse: 91 (1425)  BP: 127/85 (1425)  Temp: 97.5 °F (36.4 °C) (1425)  Do Not Use - Resp Rate: --  SpO2: 99 % (1425)     General: NAD, AOX3  HEENT: clear op, mmm, no jvd, no scleral icterus  CV: RRR S1S2 no murmurs  Extremities: No edema   Lungs: CTAB, no increased work of breathing  Abd: soft nt nd +BS no hepatosplenomegaly  Neuro: CN: II-XII grossly intact      Results:  Lab Results   Component Value Date    WBC 8.4 2024    HGB 13.3 2024    HCT 40.1 2024    MCV 82.9 2024    .0 (H) 2024     Lab Results   Component Value Date     2024    K 5.4 (H) 2024    CO2 24 2024     2024    BUN 8 2024    ALB 4.3 2024       Lab Results   Component Value Date     09/15/2016       Radiology: reviewed     Pathology: n/a    Assessment and Plan:  Elevated platelets  -She had an isolated episode of thrombocytosis in May 2024 with a count >470. We can check a JAK2/CALR/MP, CRP and FE studies  -She is up to date with GI  -We discussed that if she has iron deficiency, we can plan for IV InFED x 1 with repeat blood work  -If JAK2/CALR/MPL positive, we can consider ASA 81. Would defer on cytoreduction given young age and lack of clotting history     JEANINE Brizuela MD  [facility]  [facility]

## 2024-05-20 ENCOUNTER — OFFICE VISIT (OUTPATIENT)
Dept: HEMATOLOGY/ONCOLOGY | Age: 39
End: 2024-05-20
Attending: INTERNAL MEDICINE

## 2024-05-20 VITALS
WEIGHT: 176 LBS | HEART RATE: 91 BPM | DIASTOLIC BLOOD PRESSURE: 85 MMHG | RESPIRATION RATE: 18 BRPM | SYSTOLIC BLOOD PRESSURE: 127 MMHG | BODY MASS INDEX: 29 KG/M2 | OXYGEN SATURATION: 99 % | TEMPERATURE: 98 F

## 2024-05-20 DIAGNOSIS — R79.89 ELEVATED PLATELET COUNT: Primary | ICD-10-CM

## 2024-05-20 LAB
BASOPHILS # BLD AUTO: 0.06 X10(3) UL (ref 0–0.2)
BASOPHILS NFR BLD AUTO: 0.7 %
CRP SERPL-MCNC: <0.29 MG/DL (ref ?–0.3)
DEPRECATED HBV CORE AB SER IA-ACNC: 53.6 NG/ML
EOSINOPHIL # BLD AUTO: 0.14 X10(3) UL (ref 0–0.7)
EOSINOPHIL NFR BLD AUTO: 1.7 %
ERYTHROCYTE [DISTWIDTH] IN BLOOD BY AUTOMATED COUNT: 13.2 %
HCT VFR BLD AUTO: 40.1 %
HGB BLD-MCNC: 13.3 G/DL
IMM GRANULOCYTES # BLD AUTO: 0.02 X10(3) UL (ref 0–1)
IMM GRANULOCYTES NFR BLD: 0.2 %
IRON SATN MFR SERPL: 16 %
IRON SERPL-MCNC: 66 UG/DL
LYMPHOCYTES # BLD AUTO: 3.18 X10(3) UL (ref 1–4)
LYMPHOCYTES NFR BLD AUTO: 38.1 %
MCH RBC QN AUTO: 27.5 PG (ref 26–34)
MCHC RBC AUTO-ENTMCNC: 33.2 G/DL (ref 31–37)
MCV RBC AUTO: 82.9 FL
MONOCYTES # BLD AUTO: 0.53 X10(3) UL (ref 0.1–1)
MONOCYTES NFR BLD AUTO: 6.3 %
NEUTROPHILS # BLD AUTO: 4.42 X10 (3) UL (ref 1.5–7.7)
NEUTROPHILS # BLD AUTO: 4.42 X10(3) UL (ref 1.5–7.7)
NEUTROPHILS NFR BLD AUTO: 53 %
PLATELET # BLD AUTO: 502 10(3)UL (ref 150–450)
RBC # BLD AUTO: 4.84 X10(6)UL
TIBC SERPL-MCNC: 416 UG/DL (ref 240–450)
TRANSFERRIN SERPL-MCNC: 279 MG/DL (ref 200–360)
WBC # BLD AUTO: 8.4 X10(3) UL (ref 4–11)

## 2024-05-20 PROCEDURE — 99214 OFFICE O/P EST MOD 30 MIN: CPT | Performed by: INTERNAL MEDICINE

## 2024-05-20 NOTE — PROGRESS NOTES
Patient here for follow-up. States energy levels are poor. Denies any additional updates or changes since her last visit.

## 2024-05-22 DIAGNOSIS — F41.0 PANIC ATTACK: ICD-10-CM

## 2024-05-23 NOTE — TELEPHONE ENCOUNTER
Requesting Alprazolam 0.5mg  Last OV: 5/8/24 Physical  RTC: 1 year  Last Rx'd 4/15/24 #10 with 0 refills    Future Appointments   Date Time Provider Department Center   6/5/2024 11:00 AM PF [place] RN1 [facility] [place]   7/3/2024  9:00 AM Deisy Arvizu DO [id number] [place]       Per [place] , last dispensed 4/15/24 #10    Controlled med:  Rx pended and routed for approval/denial

## 2024-05-28 RX ORDER — ALPRAZOLAM 0.5 MG/1
0.5 TABLET ORAL
Qty: 10 TABLET | Refills: 0 | Status: SHIPPED | OUTPATIENT
Start: 2024-05-28

## 2024-06-05 ENCOUNTER — APPOINTMENT (OUTPATIENT)
Dept: HEMATOLOGY/ONCOLOGY | Age: 39
End: 2024-06-05
Attending: INTERNAL MEDICINE
Payer: COMMERCIAL

## 2024-07-03 ENCOUNTER — OFFICE VISIT (OUTPATIENT)
Facility: CLINIC | Age: 39
End: 2024-07-03
Payer: COMMERCIAL

## 2024-07-03 VITALS
HEIGHT: 65 IN | HEART RATE: 82 BPM | WEIGHT: 170 LBS | OXYGEN SATURATION: 99 % | BODY MASS INDEX: 28.32 KG/M2 | DIASTOLIC BLOOD PRESSURE: 78 MMHG | SYSTOLIC BLOOD PRESSURE: 112 MMHG

## 2024-07-03 DIAGNOSIS — N64.3 GALACTORRHEA: Primary | ICD-10-CM

## 2024-07-03 PROCEDURE — 3074F SYST BP LT 130 MM HG: CPT | Performed by: STUDENT IN AN ORGANIZED HEALTH CARE EDUCATION/TRAINING PROGRAM

## 2024-07-03 PROCEDURE — 99214 OFFICE O/P EST MOD 30 MIN: CPT | Performed by: STUDENT IN AN ORGANIZED HEALTH CARE EDUCATION/TRAINING PROGRAM

## 2024-07-03 PROCEDURE — 3008F BODY MASS INDEX DOCD: CPT | Performed by: STUDENT IN AN ORGANIZED HEALTH CARE EDUCATION/TRAINING PROGRAM

## 2024-07-03 PROCEDURE — 3078F DIAST BP <80 MM HG: CPT | Performed by: STUDENT IN AN ORGANIZED HEALTH CARE EDUCATION/TRAINING PROGRAM

## 2024-07-03 NOTE — H&P
Endocrinology Clinic Note    Name: Austyn Lowe    Date: 7/3/2024       HISTORY OF PRESENT ILLNESS   Austyn Lowe is a 39 year old female with PMHx significant for remote galactorrhea at age 18 who presents for initial endocrine consultation for pituitary assessment.  Patient reports her father had a pituitary tumor that required multiple resections and ultimately radiation therapy.  She reports a brief period of galactorrhea when she was 18 years old, at that time was evaluated and reports she had a negative MRI of the brain.  Reports she was prescribed a short-term medication that she took for a few days, unsure what it was but was told it could be linked to multiple pregnancy.  He does not remember ever taking cabergoline or bromocriptine.  Galactorrhea never recurred.  She had no nipple piercings and was not on any medications associated with galactorrhea at that time.  Reports that she has always had abnormal periods, her first menstrual period was age 14 and from menarche was always irregular, sometimes skipping up to 6 months at a time.  She denies any history of eating disorders or extreme athletics when she was young.  She is now following with gynecology and has been on birth control for several years.  Reports she has 4 children and had no trouble conceiving any of her children (2008, 2009, 2010, 2015).  Reports that she now has severe mood swings from severe sadness to extreme rage, also occasionally reports hyperactive mood.  The symptoms have been suggested to be possible PMDD and do respond to anxiolytics.  She denies any history of hirsutism, severe acne, or other signs of hyperandrogenism.  She denies any loss of peripheral vision.  She would like a pituitary evaluation to ensure that there is no hormonal explanation.    PAST MEDICAL HISTORY:   Past Medical History:    Back pain    Obesity (BMI 30-39.9)       PAST SURGICAL HISTORY:   History reviewed. No pertinent surgical history.    CURRENT  MEDICATIONS:    Current Outpatient Medications   Medication Sig Dispense Refill    ALPRAZolam 0.5 MG Oral Tab Take 1 tablet (0.5 mg total) by mouth daily as needed. 10 tablet 0    Omeprazole 40 MG Oral Capsule Delayed Release Take 1 capsule (40 mg total) by mouth every morning before breakfast.      nortriptyline 25 MG Oral Cap Take 1 capsule (25 mg total) by mouth nightly.      Levonorgest-Eth Estrad 91-Day 0.1-0.02 & 0.01 MG Oral Tab Take 1 tablet by mouth daily. 91 tablet 2         ALLERGIES:  No Known Allergies    SOCIAL HISTORY:    Social History     Socioeconomic History    Marital status: Single   Tobacco Use    Smoking status: Former     Current packs/day: 0.00     Average packs/day: 3.0 packs/day for 4.0 years (12.0 ttl pk-yrs)     Types: Cigarettes     Start date: 12/15/2008     Quit date: 12/15/2012     Years since quittin.5    Smokeless tobacco: Never   Vaping Use    Vaping status: Never Used   Substance and Sexual Activity    Alcohol use: Yes     Comment: rare    Drug use: No    Sexual activity: Not Currently     Partners: Male       FAMILY HISTORY:   Family History   Problem Relation Age of Onset    Other (pituitary tumor) Father     Thyroid disease Father     No Known Problems Mother     Other (Other) Brother         thyroid disorder    No Known Problems Brother     No Known Problems Brother     Cancer Other         No family hx Breast/Ovarian/Colon Ca         REVIEW OF SYSTEMS:  Ten point review of systems has been performed and is otherwise negative and/or non-contributory, except as described above.      PHYSICAL EXAM:   Vitals:    24 0854   BP: 112/78   Pulse: 82   SpO2: 99%   Weight: 170 lb (77.1 kg)   Height: 5' 5\" (1.651 m)     BMI: Body mass index is 28.29 kg/m².     CONSTITUTIONAL:  awake, alert, cooperative, no apparent distress, and appears stated age  PSYCH: normal affect  EYES:  No proptosis,  conjunctiva normal  ENT:  Normocephalic, atraumatic  NECK:  Supple, symmetrical,  thyroid not enlarged and no tenderness  LUNGS: breathing comfortably  CARDIOVASCULAR:  regular rate   ABDOMEN:  soft  SKIN:  no rashes and no lesions  EXTREMITIES: no edema  NEURO: Peripheral vision grossly normal      DATA:     Pertinent data reviewed  No results found.  No results found.      ASSESSMENT AND PLAN:    #Irregular menstruation  #Remote episode of galactorrhea  #Mood swings  -Clinical history as above  -Extensively reviewed pituitary physiology and pathophysiology with the patient, she does not have any obvious symptoms of a pituitary mass at this time  -Reviewed that pituitary and hormonal abnormalities do not typically cause severe mood swings in the absence of other physical features  -Patient denies any formal history of endocrine tumors syndromes in the family such as multiple endocrine neoplasia  -Will obtain 8 AM fasting pituitary profile, if normal no indication for further workup at this time  -If hormonal testing is normal, recommended the patient consider behavioral health/psychology evaluation for her severe mood swings as some of her described symptoms sound potentially consistent with a bipolar syndrome.  Would not pursue psychiatric evaluation until physical/organic diseases are formally ruled out.  -All questions answered in detail    The above plan was discussed in detail with the patient who verbalized understanding and agreement.      Deisy Arvizu DO  [facility]  7/3/2024     Note to patient: The 21 Century Cures Act makes medical notes like these available to patients in the interest of transparency. However, be advised this is a medical document. It is intended as peer to peer communication. It is written in medical language and may contain abbreviations or verbiage that are unfamiliar. It may appear blunt or direct. Medical documents are intended to carry relevant information, facts as evident, and the clinical opinion of the practitioner.

## 2024-07-03 NOTE — PATIENT INSTRUCTIONS
Let's do your pituitary labs fasting and at 8AM.  We can plan to do another set of testing in the future if you are in the middle of an episode to see if there's any difference.     Return Visit   [  ] Physician - TBD pending results  [  ] Fasting/8AM labs

## 2024-07-25 ENCOUNTER — TELEPHONE (OUTPATIENT)
Facility: CLINIC | Age: 39
End: 2024-07-25

## 2024-07-25 NOTE — TELEPHONE ENCOUNTER
07/25/2024 pt called pt didn't get orders for labs, pt requested to be mail to her    [facility] ordered where printed and mailed to her     Closing encounter

## 2024-08-20 LAB
ACTH, PLASMA: 11 PG/ML (ref 6–50)
CORTISOL, A.M.: 17.1 MCG/DL
ESTRADIOL: <15 PG/ML
FSH: 3.5 MIU/ML
IGF I, /MS: 104 NG/ML (ref 53–331)
LH: 1.2 MIU/ML
PROLACTIN: 5.8 NG/ML
T4, FREE: 1.2 NG/DL (ref 0.8–1.8)
TSH: 2.11 MIU/L
Z SCORE (FEMALE): -0.7 SD

## 2024-08-21 ENCOUNTER — TELEPHONE (OUTPATIENT)
Facility: CLINIC | Age: 39
End: 2024-08-21

## 2024-08-21 NOTE — TELEPHONE ENCOUNTER
8/218/24 rcvd via fax from [facility]     Lab Results - Not in Chart    Placed in MD in-box

## 2024-08-23 ENCOUNTER — MED REC SCAN ONLY (OUTPATIENT)
Facility: CLINIC | Age: 39
End: 2024-08-23

## 2024-08-23 NOTE — TELEPHONE ENCOUNTER
TSH 2.11  FT4 1.2  ACTH 11  Cortisol 17.1  IGF1 104  FSH 3.5  LH 1.2  Prolactin 5.8  Estradiol <15    Please advise the patients all of her pituitary labs appear completely normal. Her estradiol was low on this check but this is likely related to her birth control. There is no sign of any endocrine or hormonal cause of her symptoms. Recommend continue gyne follow up and we had also discussed a possible behavior health evaluation as some of her anger symptoms sounded similar to bipolar in some ways. Please let me know if she has any other questions, no follow up needed.

## 2024-08-26 ENCOUNTER — TELEPHONE (OUTPATIENT)
Dept: HEMATOLOGY/ONCOLOGY | Facility: HOSPITAL | Age: 39
End: 2024-08-26

## 2024-08-26 NOTE — TELEPHONE ENCOUNTER
RN phoned patient to discuss note below.    Patient verbalized understanding to follow with Gyne and Behavioral health.    Closing Encounter.

## 2024-09-12 NOTE — PROGRESS NOTES
[facility] Note    Visit Diagnosis:  1. Elevated platelet count      History of Present Illness: 39F referred by Dr. Vinson to discuss abnormal labs.    -Since 2018, she had intermittent platelet counts > 400 but never > 450. She has no B symptoms. No history of bleeding, bruising or blood clots. No smoking or ETOH abuse. She has had some GI issues recently and her US abdomen was unremarkable. She has follow up with GI. No family history of blood disorders. Fe studies from June 2023 normal.    -08/2023 EGD/colon were unremarkable-following with Dr. Patricio Watson.     -05/2024: WBC 7.2, Hb 13.7, Plt 472 --> repeat plt 502. CRP normal. Negative JAK2, CALR MPL. Ferritin 53, Iron sat 16. She started oral iron + Vit C    Interval History  -Taking her iron on and off. Causing a metallic taste. No constipation   -Plt count is normal today   -Has occasional bruising on her hands. No epistaxis. Menses are normal    Review of Systems: 12 Point ROS was completed and pertinent positives are in the HPI    Current Outpatient Medications on File Prior to Visit   Medication Sig Dispense Refill    ALPRAZolam 0.5 MG Oral Tab Take 1 tablet (0.5 mg total) by mouth daily as needed. 10 tablet 0    Omeprazole 40 MG Oral Capsule Delayed Release Take 1 capsule (40 mg total) by mouth every morning before breakfast.      nortriptyline 25 MG Oral Cap Take 1 capsule (25 mg total) by mouth nightly.      Levonorgest-Eth Estrad 91-Day 0.1-0.02 & 0.01 MG Oral Tab Take 1 tablet by mouth daily. 91 tablet 2     No current facility-administered medications on file prior to visit.     Past Medical History:    Back pain    Obesity (BMI 30-39.9)     History reviewed. No pertinent surgical history.  Social History     Socioeconomic History    Marital status: Single   Tobacco Use    Smoking status: Former     Current packs/day: 0.00     Average packs/day: 3.0 packs/day for 4.0 years (12.0 ttl pk-yrs)     Types: Cigarettes     Start  date: 12/15/2008     Quit date: 12/15/2012     Years since quittin.7    Smokeless tobacco: Never   Vaping Use    Vaping status: Never Used   Substance and Sexual Activity    Alcohol use: Yes     Comment: rare    Drug use: No    Sexual activity: Not Currently     Partners: Male      Family History   Problem Relation Age of Onset    Other (pituitary tumor) Father     Thyroid disease Father     No Known Problems Mother     Other (Other) Brother         thyroid disorder    No Known Problems Brother     No Known Problems Brother     Cancer Other         No family hx Breast/Ovarian/Colon Ca       Physical Exam  Height: --  Weight: 77.1 kg (170 lb) (1536)  BSA (Calculated - sq m): --  Pulse: 79 (1536)  BP: 139/91 (1536)  Temp: 97.4 °F (36.3 °C) (1536)  Do Not Use - Resp Rate: --  SpO2: 99 % (1536)     General: NAD, AOX3  HEENT: clear op, mmm, no jvd, no scleral icterus  CV: RRR S1S2 no murmurs  Extremities: No edema   Lungs: CTAB, no increased work of breathing  Abd: soft nt nd +BS no hepatosplenomegaly  Neuro: CN: II-XII grossly intact      Results:  Lab Results   Component Value Date    WBC 9.2 2024    HGB 14.6 2024    HCT 44.2 2024    MCV 82.9 2024    .0 2024     Lab Results   Component Value Date     2024    K 5.4 (H) 2024    CO2 24 2024     2024    BUN 8 2024    ALB 4.3 2024       Lab Results   Component Value Date     09/15/2016       Radiology: reviewed     Pathology: n/a    Assessment and Plan:  Elevated platelets  -Likely reactive to mild iron deficiency. Platelets normalized after starting oral iron  -Negative JAK2, CALR, MPL and normal CRP.   - If plt are elevated with normal FE studies, will consider a BCR-ABL and bone marrow biopsy     Occasional bruising: possibly related to nortriptyline. No menorrhagia or epistaxis. Normal platelet count. Consider VWF testing, PFA-100, Platelet  aggregation, PT, PTT if worsening.     RTC in 12 months    JEANINE Brizuela MD  [facility]

## 2024-09-16 ENCOUNTER — OFFICE VISIT (OUTPATIENT)
Dept: HEMATOLOGY/ONCOLOGY | Age: 39
End: 2024-09-16
Attending: INTERNAL MEDICINE
Payer: COMMERCIAL

## 2024-09-16 VITALS
OXYGEN SATURATION: 99 % | TEMPERATURE: 97 F | WEIGHT: 170 LBS | DIASTOLIC BLOOD PRESSURE: 91 MMHG | BODY MASS INDEX: 28 KG/M2 | RESPIRATION RATE: 18 BRPM | HEART RATE: 79 BPM | SYSTOLIC BLOOD PRESSURE: 139 MMHG

## 2024-09-16 DIAGNOSIS — R79.89 ELEVATED PLATELET COUNT: Primary | ICD-10-CM

## 2024-09-16 DIAGNOSIS — F41.0 PANIC ATTACK: ICD-10-CM

## 2024-09-16 LAB
BASOPHILS # BLD AUTO: 0.08 X10(3) UL (ref 0–0.2)
BASOPHILS NFR BLD AUTO: 0.9 %
DEPRECATED HBV CORE AB SER IA-ACNC: 75.3 NG/ML
EOSINOPHIL # BLD AUTO: 0.1 X10(3) UL (ref 0–0.7)
EOSINOPHIL NFR BLD AUTO: 1.1 %
ERYTHROCYTE [DISTWIDTH] IN BLOOD BY AUTOMATED COUNT: 13.2 %
HCT VFR BLD AUTO: 44.2 %
HGB BLD-MCNC: 14.6 G/DL
IMM GRANULOCYTES # BLD AUTO: 0.02 X10(3) UL (ref 0–1)
IMM GRANULOCYTES NFR BLD: 0.2 %
IRON SATN MFR SERPL: 24 %
IRON SERPL-MCNC: 88 UG/DL
LYMPHOCYTES # BLD AUTO: 3.6 X10(3) UL (ref 1–4)
LYMPHOCYTES NFR BLD AUTO: 39.2 %
MCH RBC QN AUTO: 27.4 PG (ref 26–34)
MCHC RBC AUTO-ENTMCNC: 33 G/DL (ref 31–37)
MCV RBC AUTO: 82.9 FL
MONOCYTES # BLD AUTO: 0.7 X10(3) UL (ref 0.1–1)
MONOCYTES NFR BLD AUTO: 7.6 %
NEUTROPHILS # BLD AUTO: 4.68 X10 (3) UL (ref 1.5–7.7)
NEUTROPHILS # BLD AUTO: 4.68 X10(3) UL (ref 1.5–7.7)
NEUTROPHILS NFR BLD AUTO: 51 %
PLATELET # BLD AUTO: 442 10(3)UL (ref 150–450)
RBC # BLD AUTO: 5.33 X10(6)UL
TOTAL IRON BINDING CAPACITY: 368 UG/DL (ref 250–425)
TRANSFERRIN SERPL-MCNC: 289 MG/DL (ref 250–380)
WBC # BLD AUTO: 9.2 X10(3) UL (ref 4–11)

## 2024-09-16 PROCEDURE — G2211 COMPLEX E/M VISIT ADD ON: HCPCS | Performed by: INTERNAL MEDICINE

## 2024-09-16 PROCEDURE — 99214 OFFICE O/P EST MOD 30 MIN: CPT | Performed by: INTERNAL MEDICINE

## 2024-09-16 NOTE — PROGRESS NOTES
Patient here for follow-up. Continues on oral iron and vitamin C (about 3x/week). Energy levels are low. Has noticed some easy bruising to her hands that self resolve in about 3 days.

## 2024-09-17 NOTE — TELEPHONE ENCOUNTER
Requesting Alprazolam 0.5mg  Last OV: 5/8/24 Physical  RTC: 1 year  Last Rx'd 5/28/24 #10 with 0 refills    No future appointments.    [place] , last [place] 5/28/24 #10    Controlled med:  Rx pended and routed for approval/denial

## 2024-09-18 RX ORDER — ALPRAZOLAM 0.5 MG
0.5 TABLET ORAL
Qty: 10 TABLET | Refills: 0 | Status: SHIPPED | OUTPATIENT
Start: 2024-09-18

## 2024-10-17 ENCOUNTER — TELEPHONE (OUTPATIENT)
Facility: CLINIC | Age: 39
End: 2024-10-17

## 2024-11-18 ENCOUNTER — OFFICE VISIT (OUTPATIENT)
Facility: CLINIC | Age: 39
End: 2024-11-18
Payer: COMMERCIAL

## 2024-11-18 VITALS
DIASTOLIC BLOOD PRESSURE: 60 MMHG | WEIGHT: 169.19 LBS | SYSTOLIC BLOOD PRESSURE: 98 MMHG | HEIGHT: 65 IN | BODY MASS INDEX: 28.19 KG/M2

## 2024-11-18 DIAGNOSIS — N94.10 DYSPAREUNIA IN FEMALE: ICD-10-CM

## 2024-11-18 DIAGNOSIS — Z00.00 ANNUAL PHYSICAL EXAM: ICD-10-CM

## 2024-11-18 DIAGNOSIS — Z11.3 SCREENING EXAMINATION FOR STI: ICD-10-CM

## 2024-11-18 DIAGNOSIS — Z01.419 ENCOUNTER FOR WELL WOMAN EXAM WITH ROUTINE GYNECOLOGICAL EXAM: Primary | ICD-10-CM

## 2024-11-18 DIAGNOSIS — F32.81 PMDD (PREMENSTRUAL DYSPHORIC DISORDER): ICD-10-CM

## 2024-11-18 DIAGNOSIS — Z30.41 ENCOUNTER FOR SURVEILLANCE OF CONTRACEPTIVE PILLS: ICD-10-CM

## 2024-11-18 PROCEDURE — 87591 N.GONORRHOEAE DNA AMP PROB: CPT | Performed by: OBSTETRICS & GYNECOLOGY

## 2024-11-18 PROCEDURE — 87491 CHLMYD TRACH DNA AMP PROBE: CPT | Performed by: OBSTETRICS & GYNECOLOGY

## 2024-11-18 RX ORDER — LEVONORGESTREL AND ETHINYL ESTRADIOL 100-20(84)
1 KIT ORAL DAILY
Qty: 91 TABLET | Refills: 3 | Status: SHIPPED | OUTPATIENT
Start: 2024-11-18

## 2024-11-18 NOTE — PROGRESS NOTES
GYN H&P     Genetic questionnaire reviewed with the patient and she will be referred for genetic counseling if the questionnaire had any positive results.    The [facility] intake form was also reviewed regarding contraception, menstrual periods, urinary health, and vaginal / sexual health    2024  4:55 PM    Chief Complaint   Patient presents with    Gynecologic Exam       HPI: Austyn is a 39 year old  Patient's last menstrual period was 2024 (exact date).  (contraception: OCPs) here for her annual gyn exam.     She has no complaints.  Still having deep dyspareunia - seeing pelvic specialist (uro-gyne)  in 2025. She reports she was diagnosed with a collapsed bladder'.  Menses are regular- light since OCPs. Denies any pelvic or breast complaints.  Satisfied with current contraception.     Discussed what a urogyne does - really need to try PT first then possible Laparoscopy    Previous encounters and chart reviewed.   3/6/2023  4:53 PM     Margaret Awadalla, PA-S    Patient presents with:  Physical: No concerns        HPI: Austyn is a 38 year old  Patient's last menstrual period was 2023.  (contraception: OCP's) here for her annual gyn exam.      She has no complaints. Menses are regular. Denies any pelvic or breast complaints. Satisfied with current contraception.      Previous encounters and chart reviewed.   3/8/2022  6:14 PM     Patient presents with:  Annual: Discuss OCP. Possible Hemmroid.        HPI: Austyn is a 37 year old  Patient's last menstrual period was 2022 (exact date).  (contraception: abstinence) here for her annual gyn exam.      Is here for WWE. Menses are regular. Denies any pelvic or breast complaints.    Was diagnosed with PMDD, was previously on nuvaring, stopped due to vaginal irritation. Desires to start on LUCIA.   Also on fluoxetine now.   Denies frequent migraines. ACHES-  Denies smoking.   OB History    Para Term   AB Living   4 4 4     3   SAB IAB Ectopic Multiple Live Births           4      # Outcome Date GA Lbr Aba/2nd Weight Sex Type Anes PTL Lv   4 Term 03/29/15 37w5d 05:15  00:10 7 lb 9.2 oz (3.435 kg) F NORMAL SPONT EPI N JAVAD   3 Term 04/17/10 38w0d  8 lb 1 oz (3.657 kg) F Vag-Spont  N JAVAD   2 Term 09 40w0d   M Vag-Spont  N DEC   1 Term 08 40w0d  7 lb 6 oz (3.345 kg) M Vag-Spont EPI  JAVAD      Obstetric Comments   Infant  at 5 months of SIDS       GYN hx:   Menarche: 14  Period Cycle (Days): 90  Period Duration (Days): 4-5  Period Flow: light  Use of Birth Control (if yes, specify type): OCP  Hx Prior Abnormal Pap: No  Pap Date: 23  Pap Result Notes: neg/neg 2019 neg/neg; 2015 Normal  Follow Up Recommendation:       Past Medical History:    Back pain    Obesity (BMI 30-39.9)     No past surgical history on file.  Allergies[1]  Medications Ordered Prior to Encounter[2]  Family History   Problem Relation Age of Onset    Other (pituitary tumor) Father     Thyroid disease Father     No Known Problems Mother     Other (Other) Brother         thyroid disorder    No Known Problems Brother     No Known Problems Brother     Cancer Other         No family hx Breast/Ovarian/Colon Ca     Social History     Socioeconomic History    Marital status: Single     Spouse name: Not on file    Number of children: Not on file    Years of education: Not on file    Highest education level: Not on file   Occupational History    Not on file   Tobacco Use    Smoking status: Former     Current packs/day: 0.00     Average packs/day: 3.0 packs/day for 4.0 years (12.0 ttl pk-yrs)     Types: Cigarettes     Start date: 12/15/2008     Quit date: 12/15/2012     Years since quittin.9     Passive exposure: Past    Smokeless tobacco: Never   Vaping Use    Vaping status: Never Used   Substance and Sexual Activity    Alcohol use: Yes     Comment: rare    Drug use: No    Sexual activity: Not Currently      Partners: Male   Other Topics Concern    Not on file   Social History Narrative    Not on file     Social Drivers of Health     Financial Resource Strain: Not on file   Food Insecurity: Not on file   Transportation Needs: Not on file   Physical Activity: Not on file   Stress: Not on file   Social Connections: Not on file   Housing Stability: Not on file       ROS:     Review of Systems:  General: denies fevers, chills, fatigue and malaise.   Eyes: no visual changes, denies headaches  ENT: no complaints, denies earaches, runny nose, epistaxis, throat pain or sore throat  Respiratory: denies SOB, dyspnea, cough or wheezing  Cardiovascular: denies chest pain, palpitations, exercise intolerance   GI: denies abdominal pain, diarrhea, constipation  : no complaints, denies dysuria, increased urinary frequency. Menses light and regular, no dysmenorrhea, no menorrhagia, + deep dyspareunia    Hematological/lymphatic: denies history of excessive bleeding or bruising, denies dizziness, lightheadedness.   Breast: denies rashes, skin changes, pain, lumps or discharge   Psychiatric: denies depression, changes in sleep patterns, anxiety  Endocrine: denies hot or cold intolerance, mood changes   Neurological: denies changes in sight, smell, hearing or taste. Denies seizures or tremors  Immunological: denies allergies, denies anaphylaxis, or swollen lymph nodes  Musculoskeletal: denies joint pain, morning stiffness, decreased range of motion         O BP 98/60   Ht 65\"   Wt 169 lb 3.2 oz (76.7 kg)   LMP 11/11/2024 (Exact Date)   BMI 28.16 kg/m²         Wt Readings from Last 6 Encounters:   11/18/24 169 lb 3.2 oz (76.7 kg)   09/16/24 170 lb (77.1 kg)   07/03/24 170 lb (77.1 kg)   05/20/24 176 lb (79.8 kg)   05/08/24 175 lb (79.4 kg)   02/07/24 183 lb (83 kg)     Exam:   GENERAL: well developed, well nourished, in no apparent distress, oriented.  SKIN: no rashes, no suspicious lesions  HEENT: normal  NECK: supple; no thyromegaly,  no adenopathy  LUNGS: clear to auscultation  CARDIOVASCULAR: normal S1, S2, RRR  BREASTS: bilaterally firm (hx of breast implants), nontender, no palpable masses or nodes, no nipple discharge, no skin changes, no axillary adenopathy. Bilateral breast procedure scars in 6 o'clock position.   ABDOMEN: no scars,  soft, non distended; non tender, no masses  PELVIC: External Genitalia: Normal appearing, no lesions.    Vagina: normal pink mucosa, no lesions, normal clear discharge.    Bladder well supported.  Minor  anterior or posterior hernias    Cervix: multiparous, no lesions , No CMT - NO NODULARITY BEHIND CERVIX - ABLE TO RECREATE PAIN ON EXAM.    Uterus: Axial with RF, mobile, non tender, normal size    Adnexa: non tender, no masses, normal size    Rectal: deferred  EXTREMITIES:  non tender without edema        A/P: Patient is 39 year old female with no complaints. Here for well woman exam.       Patient counseled on:    Diet/exercise.      Self Breast Exams     Safe sex practices / and living environment     Vaccines:  Annual Flu, Tdap +/- Gardasil  recommended (up to 45 yrs).      Pneumococcal at 65 yrs old, Shingles at 60 yrs old          Pap: neg/neg - Year:  2023- Negative  GC/Chlamydia:  Collected today  Mammogram:  NA   Dexa:  NA  Colonoscopy / Cologuard:   NA  Lipid / Cholesterol:           LIPID PANEL [7600] [Q] [[id number]] (Abnormal)  Resulted: 05/05/24 0914, Result status: Final result  Ordering provider: Jose Roberto Vinson MD  03/11/24 1038 Resulting lab: [facility]     Specimen Information    ID Type Source Collected On   [id number] Blood -- 05/04/24 0843     Components    Component Value Reference Range Flag   CHOLESTEROL, TOTAL 163 <200 mg/dL --   HDL CHOLESTEROL 43 > OR = 50 mg/dL L Low    TRIGLYCERIDES 131 <150 mg/dL --   LDL-CHOLESTEROL 97 mg/dL (calc) --   Comment:      Reference range: <100     Desirable range <100 mg/dL for primary prevention;    <70 mg/dL for patients with CHD or diabetic  patients  with > or = 2 CHD risk factors.     LDL-C is now calculated using the Leelee  calculation, which is a validated novel method providing  better accuracy than the Friedewald equation in the  estimation of LDL-C.  Rich SS et al. LISANDRO. 2013;310(19): 6284-3417  (http://education.Status Overload/faq/BDW489)   CHOL/HDLC RATIO 3.8 <5.0 (calc) --   NON-HDL CHOLESTEROL 120 <130 mg/dL (calc) --   Comment:      For patients with diabetes plus 1 major ASCVD risk  factor, treating to a non-HDL-C goal of <100 mg/dL  (LDL-C of <70 mg/dL) is considered a therapeutic  option.     Meds This Visit:    Requested Prescriptions     Signed Prescriptions Disp Refills    Levonorgest-Eth Estrad 91-Day 0.1-0.02 & 0.01 MG Oral Tab 91 tablet 3     Sig: Take 1 tablet by mouth daily.       1. Encounter for well woman exam with routine gynecological exam    2. PMDD (premenstrual dysphoric disorder)  - Levonorgest-Eth Estrad 91-Day 0.1-0.02 & 0.01 MG Oral Tab; Take 1 tablet by mouth daily.  Dispense: 91 tablet; Refill: 3    3. Encounter for surveillance of contraceptive pills  - Levonorgest-Eth Estrad 91-Day 0.1-0.02 & 0.01 MG Oral Tab; Take 1 tablet by mouth daily.  Dispense: 91 tablet; Refill: 3    4. Annual physical exam    5. Dyspareunia in female  - Pelvic Floor Therapy - [facility]    Hold off on traveling downtown - our Uro-gyne providers contact info given.  Consider Laparoscopy if PT unsuccessful    Also noting some rectal bulging - no large rectocele seen on exam   Return in about 4 weeks (around 12/16/2024) for Office Visit.    Vlad Cochran MD   11/18/2024  4:55 PM       This note was created by [employer] voice recognition. Errors in content may be related to improper recognition by the system; efforts to review and correct have been done but errors may still exist. Please contact me with any questions.    Note to patient and family   The 21st Century Cures Act makes medical notes available to patients  in the interest of transparency.  However, please be advised that this is a medical document.  It is intended as [id number]-sp-djus communication.  It is written and medical language may contain abbreviations or verbiage that are technical and unfamiliar.  It may appear blunt or direct.  Medical documents are intended to carry relevant information, facts as evident, and the clinical opinion of the practitioner.           [1] No Known Allergies  [2]   Current Outpatient Medications on File Prior to Visit   Medication Sig Dispense Refill    ALPRAZolam 0.5 MG Oral Tab Take 1 tablet (0.5 mg total) by mouth daily as needed. 10 tablet 0    Omeprazole 40 MG Oral Capsule Delayed Release Take 1 capsule (40 mg total) by mouth every morning before breakfast.      [DISCONTINUED] LEVONORGEST-ETH ESTRAD 91-DAY 0.1-0.02 & 0.01 MG Oral Tab TAKE 1 TABLET BY MOUTH DAILY 91 tablet 2     No current facility-administered medications on file prior to visit.

## 2024-11-19 LAB
C TRACH DNA SPEC QL NAA+PROBE: NEGATIVE
N GONORRHOEA DNA SPEC QL NAA+PROBE: NEGATIVE

## 2024-12-02 ENCOUNTER — TELEMEDICINE (OUTPATIENT)
Dept: FAMILY MEDICINE CLINIC | Facility: CLINIC | Age: 39
End: 2024-12-02
Payer: COMMERCIAL

## 2024-12-02 DIAGNOSIS — F41.0 PANIC ATTACK: ICD-10-CM

## 2024-12-02 RX ORDER — ALPRAZOLAM 0.5 MG
0.5 TABLET ORAL
Qty: 30 TABLET | Refills: 1 | Status: SHIPPED | OUTPATIENT
Start: 2024-12-02

## 2024-12-02 NOTE — PROGRESS NOTES
Subjective:   Patient ID: Austyn Lowe is a 39 year old female.    HPI  Ms. Lowe is a very pleasant 39-year-old female with history of migraines, panic attacks and anxiety presenting today for video visit for medication refills.  She has been prescribed with alprazolam for which she has been taking for panic attacks and anxiety.  This works for her.  She has multiple triggers but symptoms tend to be increased or heightened when she is about to have her menstrual periods.    I had reviewed past medical and family histories together with allergy and medication lists documented.    History/Other:   Review of Systems   Psychiatric/Behavioral:  Negative for agitation and behavioral problems.      Current Outpatient Medications   Medication Sig Dispense Refill    ALPRAZolam 0.5 MG Oral Tab Take 1 tablet (0.5 mg total) by mouth daily as needed. 30 tablet 1    Levonorgest-Eth Estrad 91-Day 0.1-0.02 & 0.01 MG Oral Tab Take 1 tablet by mouth daily. 91 tablet 3    Omeprazole 40 MG Oral Capsule Delayed Release Take 1 capsule (40 mg total) by mouth every morning before breakfast.       Allergies:Allergies[1]    Objective:   Physical Exam  Constitutional:       General: She is not in acute distress.  Neurological:      Mental Status: She is alert.         Assessment & Plan:   1. Panic attack    -Notes reviewed  - This medication has been effective for her and will refill at this point but will increase quantity.  She does not really use it on a daily basis but with her anxiety and panic attacks sometimes being predictable she would need to take more on certain days.  Sometimes she will not need to take it.  - This was sent to her pharmacy of preference  - Call or come in sooner if with further concerns.      This note was prepared using Dragon Medical voice recognition dictation software. As a result errors may occur. When identified these errors have been corrected. While every attempt is made to correct errors during  dictation discrepancies may still exist            No orders of the defined types were placed in this encounter.      Meds This Visit:  Requested Prescriptions     Signed Prescriptions Disp Refills    ALPRAZolam 0.5 MG Oral Tab 30 tablet 1     Sig: Take 1 tablet (0.5 mg total) by mouth daily as needed.       Imaging & Referrals:  None         [1] No Known Allergies

## 2025-01-05 ENCOUNTER — TELEPHONE (OUTPATIENT)
Dept: PHYSICAL THERAPY | Facility: HOSPITAL | Age: 40
End: 2025-01-05

## 2025-01-06 ENCOUNTER — OFFICE VISIT (OUTPATIENT)
Dept: PHYSICAL THERAPY | Age: 40
End: 2025-01-06
Attending: OBSTETRICS & GYNECOLOGY
Payer: COMMERCIAL

## 2025-01-06 DIAGNOSIS — N94.10 DYSPAREUNIA IN FEMALE: Primary | ICD-10-CM

## 2025-01-06 PROCEDURE — 97162 PT EVAL MOD COMPLEX 30 MIN: CPT | Performed by: PHYSICAL THERAPIST

## 2025-01-06 PROCEDURE — 97112 NEUROMUSCULAR REEDUCATION: CPT | Performed by: PHYSICAL THERAPIST

## 2025-01-06 NOTE — PROGRESS NOTES
MUSCULOSKELETAL AND PELVIC FLOOR EVALUATION:     Diagnosis:   Dyspareunia in female (N94.10)       Referring Provider: Vlad Cochran  Date of Evaluation:    2025    Precautions:  None Next MD visit:   2025: urogynecologist ([facility]) Dr. Shelly Helm  Date of Surgery: n/a     PATIENT SUMMARY   Austyn Lowe is a 40 year old female who presents to therapy today with complaints of painful intercourse, difficulty passing BM with need to press externally (splint) 2/2 presence of vaginal bulge which can be uncomfortable.    Pt describes pain level: worst 8/10 during intercourse     Pregnant Now: No; OCP  Obstetrical/Gynecological history:  vdx3; menses    Occupation/Activities: works in [employer])    PFDI-20: 136.47/300; Impairment= 45.49 %  PFDI 20    Scores   POPDI 6:    66.67   CRAD 8:    40.63   CINDY 6:    29.17   Summary:    136.47      Austyn describes prior level of function: insidious symptoms for several yrs. No Hx PFPT. Pt goals include \"to be able to go to the bathroom.\"  Past medical history was reviewed with Austyn. Significant findings include back pain (can still flare up with movements), controlled migraines. No hx chronic yeast infections, UTI, or STI.    URINARY HABITS  Types of symptoms: stress incontinence and nocturia  Events associated with the onset of urinary complaints: jump (trampoline), laugh, sneeze  Abdominal/Vaginal Pressure complaints: no  Urinary Frequency: feels WNL  Leaking occurs: yes  Episodes of Leakage: circumstantial/ episodic  Pad use: no  Nocturia: 1  Fluid Intake: 1.5-2L/ day  Bladder irritants: morning coffee  Post void dribble: no  Hovering: no    BOWEL HABITS  Types of symptoms: Incomplete emptying  Frequency of bowel movements: 4-5/ day  Stool consistency: [place] Stool Scale: 5 most common; also 4  Do you strain with defecation: No   Laxative use: No  External splinting required even to pass gas    SEXUAL HEALTH STATUS  Marinoff Scale:  2  Sexual [place] Status: limited frequency  Pain with initial and/or deep penetration: yes (both). Last Matt was pt's first intercourse in 9 yrs (previously abstinent)  Pain with pelvic exam/tampon use: discomfort/pain (unable to insert straight, comes out)    ASSESSMENT  Lymari presents to physical therapy evaluation with primary c/o painful intercourse, difficulty passing BM with need to press externally (splint) 2/2 presence of vaginal bulge which can be uncomfortable. The results of the objective tests and measures show internal findings of absent voluntary PFM relaxation, absent involuntary PFM contraction (improved with training in the knack), absent involuntary PFM relaxation, decreased endurance & reps of PFM contraction, min apical & post wall tissue laxity, restriction & pain to layer 1-3 PFM bilaterally (see below for specifics). Additional objective measures specifically external measures to be completed at subsequent session with focus today on prioritizing internal PFM exam. Functional deficits include but are not limited to ELAYNE with jump, laugh, sneeze; 1x nocturia; incomplete bowel emptying; increased frequency of BM 4-5x/ day; external splinting required for both defecation & flatulence; pain with both initial & deep penetration during intercourse with reduced frequency (Marinoff 2); discomfort with pelvic exam; pain with tampon use. Signs and symptoms are consistent with diagnosis of Dyspareunia in female (N94.10). Pt and PT discussed evaluation findings, pathology, POC and HEP. Pt voiced understanding and performs HEP correctly without reported pain. Skilled Pelvic Physical Therapy is medically necessary to address the above impairments and reach functional goals.    OBJECTIVE:   External measures TBA subsequent visit.    Informed consent for internal pelvic evaluation given: Yes, ongoing consent confirmed with frequent check-ins throughout.    External Observation:   Voluntary contraction:  present   Voluntary relaxation: absent  Involuntary contraction: absent - improved with instruction in the knack  Involuntary relaxation: absent    Mons pubis: WNL  Labia majora: WNL  Labia minora: WNL  Urethral meatus: WNL  Introitus: WNL  Perineal body: WNL    Sensory/Reflex:  Vestibule: normal bilaterally    Internal Examination   Pelvic Floor Muscle strength: (PERF= Power/Endurance/Reps/Fast) MMT: 4/4/5/10  Accessory Muscle Use: gluteals, adductors    Tissue Laxity Test:  Anterior Wall: WNL  Posterior Wall: Min  Apical: Min    Internal Palpation: WNL except Superficial Transverse Perineal B minimal restriction and pain  Bulbocavernosus B minimal restriction and pain  Ischiocavernosus B minimal restriction and pain  Deep Transverse Perineal B minimal restriction and pain  Compress Urethra/Sphincter R>L minimal restriction and pain  Urethrovaginalis R>L minimal restriction and pain  Pubococcygeus R minimal to moderate restriction and pain (L min restricted no pain)  Iliococcygeus R minimal to moderate restriction and pain (L min restricted no pain)  Obturator Internus R minimal to moderate restriction and pain (L min restricted no pain)    Today's Treatment and Response:   Patient education was provided on objective findings of internal evaluation and expectations with treatment outcomes. Educated on pelvic anatomy and function with diagrams and pelvic model, proper toileting posture, diaphragmatic breathing for PNS activation and pelvic floor relaxation, and knack. Discussed psychosocial aspects of pelvic pain and utility of down-training PFM via diaphragmatic breathing, stretching exercise, and internal treatments. Cued in diaphragmatic breathing with emphasis on pelvic floor lengthening/relaxation. Education provided on pelvic floor and pelvic organ anatomy and function (with use of model). Discussed common contributing factors to pelvic organ prolapse and management of symptoms via PFM strengthening, as well  as improved pressure management with functional lifting, bowel movements, etc. Discussed use of PFM strength and coordination training, as well as use of the KNACK contraction and improved pressure management strategies to mitigate symptoms.     Patient was instructed in and issued a HEP for: Diaphragmatic Breathing focusing on PFM lengthening/ relaxation, Belly Hard Belly Big with Squatty Potty/ Stepstool    Charges: PT Eval Moderate Complexity, Neuro X 1      Total Timed Treatment: 10 min     Total Treatment Time: 45 min     Based on clinical rationale and outcome measures, this evaluation involved Moderate Complexity decision making due to 1-2 personal factors/comorbidities, 4+ body structures involved/activity limitations, and evolving symptoms including stress urinary incontinence, pelvic organ prolapse, and dyspareunia  PLAN OF CARE:    Goals: (to be met in 10 visits)   Patient will demonstrate PFM lengthening WNL with coordinated diaphragmatic breathing x 10 reps to alleviate PFM pain and muscle tension, & promote more complete bowel emptying for more WNL bowel frequency to max of 2-3x/ day.  Patient will report a reduction in use of external splinting required for defecation & flatulence by at least 75%.  Patient will demonstrate normalized tone and minimal pain onset (</= 2/10) with internal assessment for increased tolerance to gynecological exam, tampon wear, & penetrative intercourse (Marinoff 0-1).  Patient will report use of the KNACK at least 75% of the time to mitigate ELAYNE with increases in IAP and to restore cough reflex.   Patient will report adherence to HEP for continued exercise benefits following cessation of PT.   Frequency / Duration: Patient will be seen for 1-2 x/week or a total of 10 visits over a 90 day period.  Treatment will include: Manual Therapy, Neuromuscular Re-education, Self-Care Home Management, Therapeutic Activities, Therapeutic Exercise, Home Exercise Program instruction, and  Modalities to include: Electrical stimulation (unattended) and Ultrasound     Education or treatment limitation: None  Rehab Potential:excellent    Patient/Family/Caregiver was advised of these findings, precautions, and treatment options and has agreed to actively participate in planning and for this course of care.    Thank you for your referral. Please co-sign or sign and return this letter via fax as soon as possible to [phone]. If you have any questions, please contact me at Dept: [phone]    Sincerely,  Electronically signed by therapist: Autumn Rae PT    Physician's certification required: Yes  I certify the need for these services furnished under this plan of treatment and while under my care.    X___________________________________________________ Date____________________    Certification From: 1/6/2025  To:4/6/2025

## 2025-01-07 NOTE — PATIENT INSTRUCTIONS
DIAPHRAGMATIC BREATHING     The diaphragm is a dome shaped muscle that forms the floor of the rib cage. It is the most efficient muscle for breathing and using this muscle aides in relaxation. Diaphragmatic breathing is an important technique to learn because it helps settle down or relax the autonomic nervous system. The correct use of diaphragmatic breathing can help to quiet brain activity resulting in the relaxation of all the muscles and organs of the body. This is accomplished by slow rhythmic breathing concentrated in the diaphragm muscle rather than the upper chest.      HOW TO DO PROPER RELAXATION BREATHING  Start by lying on your back or reclining in a chair in a relaxed position. Place your hands around the lower portion of your rib cage.  Relax your jaw by placing your tongue on the roof of your mouth and keeping your teeth slightly apart.   Take a deep breath in for 4 count through your nose, letting your rib cage widen and your abdomen expand for 4-7 second hold. Keep your upper chest, neck and shoulders relaxed as you breathe in.  As you breathe out for 8 count through your mouth, allow your abdomen and chest to fall. Exhale completely.  Remember to breathe slowly.  Do not force your breathing.  Practice this for 3-5 minutes, 1-2x/ day as needed.

## 2025-01-15 ENCOUNTER — OFFICE VISIT (OUTPATIENT)
Dept: PHYSICAL THERAPY | Age: 40
End: 2025-01-15
Attending: OBSTETRICS & GYNECOLOGY
Payer: COMMERCIAL

## 2025-01-15 PROCEDURE — 97110 THERAPEUTIC EXERCISES: CPT | Performed by: PHYSICAL THERAPIST

## 2025-01-15 PROCEDURE — 97112 NEUROMUSCULAR REEDUCATION: CPT | Performed by: PHYSICAL THERAPIST

## 2025-01-15 NOTE — PROGRESS NOTES
Patient: Austyn Lowe (40 year old, female) Referring Provider:  Insurance:   Diagnosis: No data recorded Vlad Cochran  [place]   Date of Surgery: n/a Next MD visit:  N/A   Precautions:  None 1/30/2025: urogynecologist ([facility]) Dr. Shelly Helm  Referral Information:    Date of Evaluation: Req: 0, Auth: 0, Exp:     1/6/2025 POC Auth Visits:  No data recorded       Today's Date   1/15/2025    Subjective  Pt wanted to ask: what should I expect/ gain from this       Pain: 0/10     Objective   Hip       1/15/2025   Hip ROM/MMT   Rt Hip %   Lt Hip ER 75%   Rt Hip IR 50%   Lt Hip IR 75%   , LE Flexibility       1/15/2025   LE Flexibility   Rt Hamstring 75%   Lt Hamstring 75%   Rt Piriformis 50%   Lt Piriformis 50%         Assessment  Increased tightness to R hip IR PROM may be correlated with presence of increased R PFM restriction & pain. Relief expressed with incorporation of new PFM mobility/ stretches added. All questions answered by end of visit.    Goals (to be met in 10 visits)   Goals       Therapy Goals     (to be met in 10 visits)   Patient will demonstrate PFM lengthening WNL with coordinated diaphragmatic breathing x 10 reps to alleviate PFM pain and muscle tension, & promote more complete bowel emptying for more WNL bowel frequency to max of 2-3x/ day.  Patient will report a reduction in use of external splinting required for defecation & flatulence by at least 75%.  Patient will demonstrate normalized tone and minimal pain onset (</= 2/10) with internal assessment for increased tolerance to gynecological exam, tampon wear, & penetrative intercourse (Marinoff 0-1).  Patient will report use of the KNACK at least 75% of the time to mitigate ELAYNE with increases in IAP and to restore cough reflex.   Patient will report adherence to HEP for continued exercise benefits following cessation of PT.               Plan  Cont with external ortho exam with incorporation of relevant TX as  indicated.    Treatment Last 4 Visits       1/15/2025   Treatment   Treatment Day 2   Therapeutic Exercise Pt education: goals, expectations for PFPT including review of current findings  HEP: sustainable, manageable, realistic  Flat back stretch 2 X 30\" tableside  Standing hip Add stretch 1 X 30\" ea tableside  Supine butterfly stretch 1 X 30\"  Supine happy baby stretch 1 X 30\"  Cat-cow X 5  Tail wags X 5  Figure-4 piriformis stretch 1 X 30\" ea  Breathe throughout the stretches^   Neuro Re-Ed Possible use of wand/ dilators for dyspareunia  Prolapse device- visual for POP: with goals for TX   Therapeutic Exercise Min 30   Neuro Re-Ed Min 12   Total of Timed Procedures 42   Total of Service Based 0   Total Treatment Time 42         HEP  IE: Diaphragmatic Breathing focusing on PFM lengthening/ relaxation, Belly Hard Belly Big with Squatty Potty/ Stepstool    Access Code: [email or web address]  URL: [email or web address]  Date: 01/15/2025  Prepared by: Autumn Rae    Exercises  - Standing 'L' Stretch at Counter  - 1-2 x daily - 7 x weekly - 3 sets - 30 sec hold  - Seated Hip Adductor Stretch  - 1-2 x daily - 7 x weekly - 3 sets - 30 sec hold  - Supine Butterfly Groin Stretch  - 1-2 x daily - 7 x weekly - 3 sets - 30 sec hold  - Supine Pelvic Floor Stretch  - 1-2 x daily - 7 x weekly - 3 sets - 30-60 sec hold  - Child's Pose Knees Apart and Hands Forward   - 1-2 x daily - 7 x weekly - 3 sets - 30 sec hold  - Cat Cow  - 1-2 x daily - 7 x weekly - 1 sets - 10 reps - 2-3 sec hold  - Tail Wag  - 1-2 x daily - 7 x weekly - 1 sets - 10 reps - 2-3 sec hold  - Supine Figure 4 Piriformis Stretch  - 1-2 x daily - 7 x weekly - 3 sets - 30 sec hold    Charges     Therex X 2, Neuro X 1

## 2025-01-16 NOTE — PATIENT INSTRUCTIONS
Access Code: [email or web address]  URL: [email or web address]  Date: 01/15/2025  Prepared by: Autumn Rae    Exercises  - Standing 'L' Stretch at Counter  - 1-2 x daily - 7 x weekly - 3 sets - 30 sec hold  - Seated Hip Adductor Stretch  - 1-2 x daily - 7 x weekly - 3 sets - 30 sec hold  - Supine Butterfly Groin Stretch  - 1-2 x daily - 7 x weekly - 3 sets - 30 sec hold  - Supine Pelvic Floor Stretch  - 1-2 x daily - 7 x weekly - 3 sets - 30-60 sec hold  - Child's Pose Knees Apart and Hands Forward   - 1-2 x daily - 7 x weekly - 3 sets - 30 sec hold  - Cat Cow  - 1-2 x daily - 7 x weekly - 1 sets - 10 reps - 2-3 sec hold  - Tail Wag  - 1-2 x daily - 7 x weekly - 1 sets - 10 reps - 2-3 sec hold  - Supine Figure 4 Piriformis Stretch  - 1-2 x daily - 7 x weekly - 3 sets - 30 sec hold

## 2025-01-22 ENCOUNTER — OFFICE VISIT (OUTPATIENT)
Dept: PHYSICAL THERAPY | Age: 40
End: 2025-01-22
Attending: OBSTETRICS & GYNECOLOGY
Payer: COMMERCIAL

## 2025-01-22 PROCEDURE — 97112 NEUROMUSCULAR REEDUCATION: CPT | Performed by: PHYSICAL THERAPIST

## 2025-01-22 PROCEDURE — 97110 THERAPEUTIC EXERCISES: CPT | Performed by: PHYSICAL THERAPIST

## 2025-01-22 NOTE — PROGRESS NOTES
Patient: Austyn Lowe (40 year old, female) Referring Provider:  Insurance:   Diagnosis: Dyspareunia in female (N94.10) Vlad Cochran  [place]   Date of Surgery: n/a Next MD visit:  N/A   Precautions:  None 1/30/2025: urogynecologist ([facility]) Dr. Shelly Helm Referral Information:    Date of Evaluation: Req: 0, Auth: 0, Exp:     1/6/2025 POC Auth Visits:  10        Today's Date   1/22/2025    Subjective  Pt working on the tailwag exercise, feels it helped. Needing to lean forward to ensure bladder is emptied all the way, not sure she is fully emptying       Pain: 0/10     Objective  doming with abdominal bracing lacking sufficient drawing in with initial attempts     Trunk       1/22/2025   Trunk ROM/MMT   Trunk Flex 75%   Trunk Extension 75%   Trunk Rt Side Bend 75%   Trunk Lt Side Bend 75%   Trunk Rt Rotation 75%   Trunk Lt Rotation 75%   , Hip       1/15/2025 1/22/2025   Hip ROM/MMT   Rt Hip Extension MMT  4-   Lt Hip Extension MMT  4-   Rt Hip Abduction MMT  3+   Lt Hip Abduction MMT  3+   Rt Hip %    Lt Hip ER 75%    Rt Hip IR 50%    Lt Hip IR 75%      Assessment  Improved form noted with abdominal bracing upon instruction in TA activation, with good coordination with breathing. Cont with external ortho measures today for comprehensive TX. Added in glute work to address strength deficit.    Goals (to be met in 10 visits)   Goals       Therapy Goals     (to be met in 10 visits)   Patient will demonstrate PFM lengthening WNL with coordinated diaphragmatic breathing x 10 reps to alleviate PFM pain and muscle tension, & promote more complete bowel emptying for more WNL bowel frequency to max of 2-3x/ day.  Patient will report a reduction in use of external splinting required for defecation & flatulence by at least 75%.  Patient will demonstrate normalized tone and minimal pain onset (</= 2/10) with internal assessment for increased tolerance to gynecological exam, tampon wear, &  penetrative intercourse (Marinoff 0-1).  Patient will report use of the KNACK at least 75% of the time to mitigate ELAYNE with increases in IAP and to restore cough reflex.   Patient will report adherence to HEP for continued exercise benefits following cessation of PT.               Plan  Anticipate internal pelvic re-check next visit pending pt's informed consent to confirm PFM lengthening; may incorporate internal PFM MFR additionally pending any STRs/ TTP noted.    Treatment Last 4 Visits       1/15/2025 1/22/2025   Treatment   Treatment Day 2 3   Therapeutic Exercise Pt education: goals, expectations for PFPT including review of current findings  HEP: sustainable, manageable, realistic  Flat back stretch 2 X 30\" tableside  Standing hip Add stretch 1 X 30\" ea tableside  Supine butterfly stretch 1 X 30\"  Supine happy baby stretch 1 X 30\"  Cat-cow X 5  Tail wags X 5  Figure-4 piriformis stretch 1 X 30\" ea  Breathe throughout the stretches^ HEP review  Instruct in LTR  Hooklying hip Abd Pradip Black TB 5\" 2 X 10  Prone B hip IR stretch 10\" X 10  S/L hip Abd 2 X 10 ea  Seated towel roll (3) external perineal stretch X 3' ea R/L   Neuro Re-Ed Possible use of wand/ dilators for dyspareunia  Prolapse device- visual for POP: with goals for TX TA bracing instruction hooklying  TA bracing with breathing coordination hooklying  Bladder emptying techniques   Therapeutic Exercise Min 30 25   Neuro Re-Ed Min 12 20   Total of Timed Procedures 42 45   Total of Service Based 0 0   Total Treatment Time 42 45         HEP  Bladder emptying techniques    Access Code: [email or web address]  URL: [email or web address]  Date: 01/22/2025  Prepared by: Autumn Rae    Exercises  - Levator Ani Self Trigger Point Release  - 1 x daily - 3 min duration  - Supine Lower Trunk Rotation  - 1-2 x daily - 7 x weekly - 2 sets - 10 reps - 5 sec hold  - Sidelying Hip Abduction  - 1 x daily - 5 x weekly - 3 sets - 10 reps  - Hooklying Clamshell with  Resistance  - 1 x daily - 5 x weekly - 3 sets - 10 reps - 5 sec hold  - Supine Transversus Abdominis Bracing - Hands on Stomach  - 1 x daily - 5 x weekly - 2 sets - 10 reps    Charges     Therex X 2, Neuro X 1

## 2025-01-22 NOTE — PATIENT INSTRUCTIONS
To promote more complete bladder emptying:  Sit with feet wide apart & heels on ground, elbows propped on thighs, lean forward & breathe from diaphragm to tip bladder anteriorly.  Pull upwards (do not push) on the lower pelvic/ pubic region to lift bladder  Can “rock & roll” & complete pelvic circles to aid with bladder emptying  When finished voiding, stand up; if you are feeling you need to void again you may sit and repeat if needed (double void)      Access Code: [email or web address]  URL: [email or web address]  Date: 01/22/2025  Prepared by: Autumn Rae    Exercises  - Levator Ani Self Trigger Point Release  - 1 x daily - 3 min duration  - Supine Lower Trunk Rotation  - 1-2 x daily - 7 x weekly - 2 sets - 10 reps - 5 sec hold  - Sidelying Hip Abduction  - 1 x daily - 5 x weekly - 3 sets - 10 reps  - Hooklying Clamshell with Resistance  - 1 x daily - 5 x weekly - 3 sets - 10 reps - 5 sec hold  - Supine Transversus Abdominis Bracing - Hands on Stomach  - 1 x daily - 5 x weekly - 2 sets - 10 reps

## 2025-01-29 ENCOUNTER — OFFICE VISIT (OUTPATIENT)
Dept: PHYSICAL THERAPY | Age: 40
End: 2025-01-29
Attending: OBSTETRICS & GYNECOLOGY
Payer: COMMERCIAL

## 2025-01-29 PROCEDURE — 97110 THERAPEUTIC EXERCISES: CPT | Performed by: PHYSICAL THERAPIST

## 2025-01-29 PROCEDURE — 97112 NEUROMUSCULAR REEDUCATION: CPT | Performed by: PHYSICAL THERAPIST

## 2025-01-29 NOTE — PATIENT INSTRUCTIONS
THE PELVIC BRACE    The pelvic brace is a combined pelvic floor and transverse abdominal low intensity muscle contraction. The transverse abdominal muscle is the deepest layer of the abdominal muscles and it aids in supporting the pelvic organs, spine and pelvis. Contraction of the transverse abdominal muscle creates a mild intensity tension of the lateral lower abdominal wall. Regular exercise of these muscles can build strength, awareness and retrain the muscles how to function together.     Correctly using and coordinating your pelvic floor and abdominal muscles can be the key to controlling your incontinence problem. The pelvic brace exercise creates an internal girdle to support your bladder and pelvic organs. First learn to perform the pelvic brace correctly, and then use the brace with physical activities that put excessive pressure on the pelvic organs and pelvic floor muscles.     POSTURAL MUSCLES  Many muscles in our body function all day long to keep us upright against gravity. We rarely notice that these muscles are even working as they perform at very low intensity. The transverse abdominal and pelvic floor muscles are included in this group of muscles along with muscles such as your back and neck muscles. When one of the muscles get weak it requires retraining to get it working again.     GUIDELINES TO PERFORMING THE PELVIC BRACE  The pelvic brace contraction creates a feeling of deep tension and drawing in of the lower abdomen and pelvic floor.   Your spine (low back) should be in neutral position.  Your therapist will help you find your neutral position.  Always perform the exercise as instructed by your therapist.   The correct contraction creates a hollowing of the lower belly to the bikini line.  Do not strain, bear down or bulge your abdomen as you do the exercise.    PERFORMING THE PELVIC BRACE  Place your fingers on your lower abdomen just inside your pelvic bones. You should feel the low  level contraction under your fingertips.  Contract the pelvic floor creating tension in the surface layer muscles that moves up to you abdomen and stops at the bikini line. This draws in and flattens the lower and side muscles of your abdomen.  Imagine drawing the vagina or scrotum into the body.   Tighten both the muscles as if you are trying to zip up a pair of pants to the bikini line.   Breathe while you maintain the low level contraction.  Remember, do not bulge your belly, strain or allow movement in the pelvis or back.  If you feel the quality of your exercise decline by starting to strain or bulge the abdominal muscles, stop your exercise session.  © 2004, [employer]         THE PELVIC BRACE WITH DAILY ACTIVITIES  Try the following daily activities in lying, sitting and standing positions.    The pelvic brace and cough  Breathe in, as you prepare to cough, bring your hand to your mouth and do the pelvic brace.   Hold the muscle and cough.  Now relax the brace.   If coughing causes leakage try this activity while clearing your throat.  Repeat ___ times    The pelvic brace and sit to stand  Breathe in as you prepare to stand   Do the pelvic brace.  Hold the muscles and stand up.   Be sure your therapist has shown you the proper technique.  Repeat ___ times.     The pelvic brace and lifting  Place a lightweight object of __lbs. on a table or the floor.  Place your feet shoulder width apart and keep your back straight.   Perform the pelvic brace, bend your knees to reach the object and lift.     Repeat ___ times.    Other activities:  ______________________________________________________________________    ______________________________________________________________________    ______________________________________________________________________      © 2004, [employer]

## 2025-01-29 NOTE — PROGRESS NOTES
Patient: Austyn Lowe (40 year old, female) Referring Provider:  Insurance:   Diagnosis: Dyspareunia in female (N94.10) Vlad Cochran  [employer]   Date of Surgery: n/a Next MD visit:  N/A   Precautions:  None 1/30/2025: urogynecologist ([facility]) Dr. Shelly Helm Referral Information:    Date of Evaluation: Req: 0, Auth: 0, Exp:     1/6/2025 POC Auth Visits:  10       Today's Date   1/29/2025    Subjective  Completing ex's. Cont to splint       Pain: 0/10     Objective  Informed verbal consent for internal pelvic evaluation given: Yes    External Observation:   Voluntary contraction: present   Voluntary relaxation: present - slight reduced upon repeated reps  Involuntary relaxation: present - less on demand/ at rest, more noted with Diaphragmatic Breathing with PT cueing    Mons pubis: WNL  Labia majora: WNL  Labia minora: WNL  Urethral meatus: WNL  Introitus: WNL  Perineal body: WNL    Sensory/Reflex:  Vestibule: normal bilaterally    Internal Palpation: WNL     Assessment  Pt in agreement to proceed with internal PFM re-check today. Reduced restriction to PFM without any pain reproduced with palpation. Pt cont to req cues to faciltiate proper PFM breathing coordination both with contraction & with relaxation. Upon practice & continued reps, pt had improved mastery with reduced cues. Able to successfully progress to pelvic brace adding in TA bracing. Pt will work on pelvic brace including with ADLs as able for HEP. Relief in supportive pelvic elevation position.    Goals (to be met in 10 visits)   Goals       Therapy Goals     (to be met in 10 visits)   Patient will demonstrate PFM lengthening WNL with coordinated diaphragmatic breathing x 10 reps to alleviate PFM pain and muscle tension, & promote more complete bowel emptying for more WNL bowel frequency to max of 2-3x/ day.  Patient will report a reduction in use of external splinting required for defecation & flatulence by at least  75%.  Patient will demonstrate normalized tone and minimal pain onset (</= 2/10) with internal assessment for increased tolerance to gynecological exam, tampon wear, & penetrative intercourse (Marinoff 0-1).  Patient will report use of the KNACK at least 75% of the time to mitigate ELAYNE with increases in IAP and to restore cough reflex.   Patient will report adherence to HEP for continued exercise benefits following cessation of PT.               Plan  Review pelvic brace with pelvic elevation, with prox hip strengthening    Treatment Last 4 Visits       1/15/2025 1/22/2025 1/29/2025   Treatment   Treatment Day 2 3 4   Therapeutic Exercise Pt education: goals, expectations for PFPT including review of current findings  HEP: sustainable, manageable, realistic  Flat back stretch 2 X 30\" tableside  Standing hip Add stretch 1 X 30\" ea tableside  Supine butterfly stretch 1 X 30\"  Supine happy baby stretch 1 X 30\"  Cat-cow X 5  Tail wags X 5  Figure-4 piriformis stretch 1 X 30\" ea  Breathe throughout the stretches^ HEP review  Instruct in LTR  Hooklying hip Abd Pradip Black TB 5\" 2 X 10  Prone B hip IR stretch 10\" X 10  S/L hip Abd 2 X 10 ea  Seated towel roll (3) external perineal stretch X 3' ea R/L Deep squat stretch at wall X 3'  Bridge with hip Add pradip 2 X 10   Neuro Re-Ed Possible use of wand/ dilators for dyspareunia  Prolapse device- visual for POP: with goals for TX TA bracing instruction hooklying  TA bracing with breathing coordination hooklying  Bladder emptying techniques PFM breathing coordination with internal manual cueing/ feedback  Instruction in pelvic brace with internal manual cueing/ feedback  Instruction in pelvic brace with ADLs  Pelvic elevation: trial with 1 pillow under pelvis in supine & with BLE on SB   Therapeutic Exercise Min 30 25 10   Neuro Re-Ed Min 12 20 35   Total of Timed Procedures 42 45 45   Total of Service Based 0 0 0   Total Treatment Time 42 45 45         HEP  Pelvic brace, pelvic  brace with ADLs  Pelvic elevation with 1-2 pillows under pelvis and/ or with BLE elevated    Charges     Neuro X 2, Therex X 1

## 2025-02-05 ENCOUNTER — APPOINTMENT (OUTPATIENT)
Dept: PHYSICAL THERAPY | Age: 40
End: 2025-02-05
Attending: OBSTETRICS & GYNECOLOGY
Payer: COMMERCIAL

## 2025-02-12 ENCOUNTER — APPOINTMENT (OUTPATIENT)
Dept: PHYSICAL THERAPY | Age: 40
End: 2025-02-12
Attending: OBSTETRICS & GYNECOLOGY
Payer: COMMERCIAL

## 2025-02-12 ENCOUNTER — OFFICE VISIT (OUTPATIENT)
Dept: FAMILY MEDICINE CLINIC | Facility: CLINIC | Age: 40
End: 2025-02-12
Payer: COMMERCIAL

## 2025-02-12 VITALS
TEMPERATURE: 98 F | DIASTOLIC BLOOD PRESSURE: 60 MMHG | OXYGEN SATURATION: 100 % | SYSTOLIC BLOOD PRESSURE: 110 MMHG | BODY MASS INDEX: 27.32 KG/M2 | HEART RATE: 76 BPM | WEIGHT: 164 LBS | HEIGHT: 65 IN | RESPIRATION RATE: 16 BRPM

## 2025-02-12 DIAGNOSIS — J06.9 VIRAL URI WITH COUGH: ICD-10-CM

## 2025-02-12 DIAGNOSIS — J45.21 MILD INTERMITTENT ASTHMA WITH ACUTE EXACERBATION (HCC): Primary | ICD-10-CM

## 2025-02-12 PROCEDURE — 3078F DIAST BP <80 MM HG: CPT | Performed by: FAMILY MEDICINE

## 2025-02-12 PROCEDURE — 3008F BODY MASS INDEX DOCD: CPT | Performed by: FAMILY MEDICINE

## 2025-02-12 PROCEDURE — 99213 OFFICE O/P EST LOW 20 MIN: CPT | Performed by: FAMILY MEDICINE

## 2025-02-12 PROCEDURE — 3074F SYST BP LT 130 MM HG: CPT | Performed by: FAMILY MEDICINE

## 2025-02-12 RX ORDER — FLUTICASONE PROPIONATE 50 MCG
2 SPRAY, SUSPENSION (ML) NASAL DAILY
Qty: 1 EACH | Refills: 0 | Status: SHIPPED | OUTPATIENT
Start: 2025-02-12 | End: 2026-02-07

## 2025-02-12 RX ORDER — ALBUTEROL SULFATE 90 UG/1
2 INHALANT RESPIRATORY (INHALATION) EVERY 4 HOURS PRN
Qty: 1 EACH | Refills: 0 | Status: SHIPPED | OUTPATIENT
Start: 2025-02-12

## 2025-02-12 NOTE — PROGRESS NOTES
[facility] Visit Note  2/12/2025      Subjective:      Patient ID: Austyn Lowe is a 40 year old female.    Chief Complaint:  Chief Complaint   Patient presents with    Cough     States since Friday she's had a cough, pain in ears L>R, drainage from left ear, and hard to hear out of left ear, headaches & difficulty breathing/feels like not getting enough air into her lungs. Has tried otc Tylenol Cold & Flu & Robitussin.       HPI:  Austyn Lowe is a 40 year old female who is being seen today for the above.      Sick-  Had diarrhea and myalgias in the beginning. since Friday (6th day) she's had a sometimes productive cough, pain in ears L>R, drainage from left ear, and hard to hear out of left ear, headaches & difficulty breathing/feels like not getting enough air into her lungs. Cooking dinner was difficult for her breathing.  Has had inhalers in the past. Usually uses it with a cold and has had pneumonia many times.     Has tried otc Tylenol Cold & Flu & Robitussin.  Has not taken a covid test.  Daughter was sick with flu, test confirmed.  Took off mon 2/10/25 and would like to go back next mon 2/17/25      Denies- fever, chills, n/v, sore throat      Review of Systems - as stated above in the HPI      Objective:     Vitals:    02/12/25 0753   BP: 110/60   Pulse: 76   Resp: 16   Temp: 98.1 °F (36.7 °C)   TempSrc: Temporal   SpO2: 100%   Weight: 164 lb (74.4 kg)   Height: 5' 5\" (1.651 m)       Physical Examination   General:  Alert, in no acute distress  HEENT: NCAT, EOMI, normal R Tms, L TM retracted, normal oropharynx, and nasal turbinates.  Neck:  + tender  cervical lymphadenopathy  CV: Regular rate and rhythm. No murmurs, gallops, or rubs.  Lungs:  Clear to auscultation B, no wheezes, rales, or rhonchi, normal respiratory effort  Abd:  +bowel sounds, soft  Ext:  No pedal edema,  Pedal pulses 2+ B        Assessment:     1. Mild intermittent asthma with acute exacerbation (HCC)  - albuterol 108 (90  Base) MCG/ACT Inhalation Aero Soln; Inhale 2 puffs into the lungs every 4 (four) hours as needed for Shortness of Breath.  Dispense: 1 each; Refill: 0    2. Viral URI with cough  - fluticasone propionate 50 MCG/ACT Nasal Suspension; 2 sprays by Each Nare route daily.  Dispense: 1 each; Refill: 0    -letter for work given      Return for if symptoms worsen/don't improve, annual physical in May.

## 2025-02-14 ENCOUNTER — PATIENT MESSAGE (OUTPATIENT)
Dept: FAMILY MEDICINE CLINIC | Facility: CLINIC | Age: 40
End: 2025-02-14

## 2025-02-14 NOTE — TELEPHONE ENCOUNTER
Patient was in the office.  Reviewed and signed document.  Copies made.  Original given to patient.     Documents faxed to [employer] at [phone].  Faxed confirmation received.     Forms sent to scan.

## 2025-02-14 NOTE — TELEPHONE ENCOUNTER
Patient called to make sure form was received. I printed for the provider and placed in MA folder.   SM - pended depo order for NV today, if appropriate, please advise, ty!

## 2025-02-14 NOTE — TELEPHONE ENCOUNTER
Form completed. Please contact pt on how she wants to obtain, etc   Meds-to-Beds: Discharge prescription orders listed below delivered to patient's bedside. RN Virginia notified. Patient counseled. Patient elected to have co-payment billed to patient account.      Current Outpatient Medications   Medication Sig Dispense Refill    acetaminophen (TYLENOL) 500 MG Tab Take 2 Tablets by mouth every 6 hours. Do not exceed 4000 mg in 24 hours. 30 Tablet 0    ibuprofen (MOTRIN) 800 MG Tab Take 1 Tablet by mouth every 8 hours. Indications: Joint Damage causing Pain and Loss of Function 30 Tablet 0    oxyCODONE immediate-release (ROXICODONE) 5 MG Tab Take 1 Tablet by mouth every 6 hours as needed for Severe Pain for up to 5 days. 20 Tablet 0    sennosides-docusate sodium (SENOKOT-S) 8.6-50 MG tablet Take 1 Tablet by mouth 2 times a day as needed (Constipation). 30 Tablet 0      Christina Gutierrez, PharmD

## 2025-02-14 NOTE — TELEPHONE ENCOUNTER
Spoke to patient. Informed her that the form is complete but there is a section that requires her to fill out and sign. Patient states she will come to the office to complete her portion. Once completed, will make a copy for patient and fax to employer. Informed patient that the office closes at 1500. Patient verbalized understanding.

## 2025-02-19 ENCOUNTER — OFFICE VISIT (OUTPATIENT)
Dept: PHYSICAL THERAPY | Age: 40
End: 2025-02-19
Attending: OBSTETRICS & GYNECOLOGY
Payer: COMMERCIAL

## 2025-02-19 PROCEDURE — 97112 NEUROMUSCULAR REEDUCATION: CPT | Performed by: PHYSICAL THERAPIST

## 2025-02-19 PROCEDURE — 97110 THERAPEUTIC EXERCISES: CPT | Performed by: PHYSICAL THERAPIST

## 2025-02-19 NOTE — PROGRESS NOTES
Patient: Austyn Lowe (40 year old, female) Referring Provider:  Insurance:   Diagnosis: Dyspareunia in female (N94.10) Vlad Cochran  [employer] [place]   Date of Surgery: n/a Next MD visit:  N/A   Precautions:  None none scheduled - pt is looking for urogyne (reports provider had to cxl previous appt) Referral Information:    Date of Evaluation: Req: 0, Auth: 0, Exp:     No data recorded POC Auth Visits:  10       Today's Date   2/19/2025    Subjective  feeling better, some incidents with coughing- ELAYNE; bowels ok- still w/ splinting       Pain: 0/10     Objective  see flowsheet for details    Assessment  Emphasizing pelvic brace progressions with addition of prox hip strengthening. Able to palpate proper TA bracing, including with breathing coordination, with application of cues. Sufficient muscle fatigue, though no pain. Utilized pillow under pelvis for supportive pelvic elevation for gravity assist.    Goals (to be met in 10 visits)   Goals       Therapy Goals     (to be met in 10 visits)   Patient will demonstrate PFM lengthening WNL with coordinated diaphragmatic breathing x 10 reps to alleviate PFM pain and muscle tension, & promote more complete bowel emptying for more WNL bowel frequency to max of 2-3x/ day.  Patient will report a reduction in use of external splinting required for defecation & flatulence by at least 75%.  Patient will demonstrate normalized tone and minimal pain onset (</= 2/10) with internal assessment for increased tolerance to gynecological exam, tampon wear, & penetrative intercourse (Marinoff 0-1).  Patient will report use of the KNACK at least 75% of the time to mitigate ELAYNE with increases in IAP and to restore cough reflex.   Patient will report adherence to HEP for continued exercise benefits following cessation of PT.               Plan  May revisit internal PFM re-check to confirm proper PFM activation with breathing with pelvic brace progressions. Pt is aware PT will be out of  office from 2/20 & returning 2/26 & will contact central scheduling/ PFN direct line if any issues arise during this time. Next appointment confirmed.    Treatment Last 4 Visits       1/15/2025 1/22/2025 1/29/2025 2/19/2025   PT Treatment   Treatment Day 2 3 4 5   Therapeutic Exercise Pt education: goals, expectations for PFPT including review of current findings  HEP: sustainable, manageable, realistic  Flat back stretch 2 X 30\" tableside  Standing hip Add stretch 1 X 30\" ea tableside  Supine butterfly stretch 1 X 30\"  Supine happy baby stretch 1 X 30\"  Cat-cow X 5  Tail wags X 5  Figure-4 piriformis stretch 1 X 30\" ea  Breathe throughout the stretches^ HEP review  Instruct in LTR  Hooklying hip Abd Pradip Black TB 5\" 2 X 10  Prone B hip IR stretch 10\" X 10  S/L hip Abd 2 X 10 ea  Seated towel roll (3) external perineal stretch X 3' ea R/L Deep squat stretch at wall X 3'  Bridge with hip Add pradip 2 X 10 TM: incline L5, 1.7 mph X 5'  Deep squat stretch at wall X 3'  Prone hip IR active stretch 10 X 10\" - HEP  Supine hip IR/ ER stretch X 10 - HEP  Education: goals for TX in re: symptoms (repetition)   Neuro Re-Ed Possible use of wand/ dilators for dyspareunia  Prolapse device- visual for POP: with goals for TX TA bracing instruction hooklying  TA bracing with breathing coordination hooklying  Bladder emptying techniques PFM breathing coordination with internal manual cueing/ feedback  Instruction in pelvic brace with internal manual cueing/ feedback  Instruction in pelvic brace with ADLs  Pelvic elevation: trial with 1 pillow under pelvis in supine & with BLE on SB Form check TA bracing, pelvic brace (external palpation TA) with breathing coordination  Pelvic brace breathing coordination with:  -hooklying hip Add pradip yellow ball X 3' with 1 pillow under pelvis  -hooklying hip Abd pradip black pilates ring X 3' with 1 pillow under pelvis  -bridge with 1 pillow under pelvis X 3'   Therapeutic Exercise Min 30 25 10 23    Neuro Re-Ed Min 12 20 35 17   Total of Timed Procedures 42 45 45 40   Total of Service Based 0 0 0 0   Total Treatment Time 42 45 45 40         HEP  Access Code: [email or web address]  URL: [email or web address]  Date: 02/19/2025  Prepared by: Autumn Rae    Exercises  - Prone Hip Internal Rotation AROM  - 1 x daily - 7 x weekly - 1-2 sets - 10 reps - 10 sec hold  - Supine Hip Internal and External Rotation  - 1 x daily - 7 x weekly - 1-2 sets - 10 reps - 5 sec hold    Charges     Therex X 2, Neuro X 1

## 2025-02-20 NOTE — PATIENT INSTRUCTIONS
Access Code: [email or web address]  URL: [email or web address]  Date: 02/19/2025  Prepared by: Autumn Rae    Exercises  - Prone Hip Internal Rotation AROM  - 1 x daily - 7 x weekly - 1-2 sets - 10 reps - 10 sec hold  - Supine Hip Internal and External Rotation  - 1 x daily - 7 x weekly - 1-2 sets - 10 reps - 5 sec hold  
0 = understands/communicates without difficulty

## 2025-02-26 ENCOUNTER — OFFICE VISIT (OUTPATIENT)
Dept: PHYSICAL THERAPY | Age: 40
End: 2025-02-26
Attending: OBSTETRICS & GYNECOLOGY
Payer: COMMERCIAL

## 2025-02-26 PROCEDURE — 97140 MANUAL THERAPY 1/> REGIONS: CPT | Performed by: PHYSICAL THERAPIST

## 2025-02-26 PROCEDURE — 97112 NEUROMUSCULAR REEDUCATION: CPT | Performed by: PHYSICAL THERAPIST

## 2025-02-27 NOTE — PROGRESS NOTES
Patient: Austyn Lowe (40 year old, female) Referring Provider:  Insurance:   Diagnosis: Dyspareunia in female (N94.10) Vlad Cochran  [employer]   Date of Surgery: n/a Next MD visit:  N/A   Precautions:  None none scheduled - pt is looking for urogyne (reports provider had to cxl previous appt) Referral Information:    Date of Evaluation: Req: 0, Auth: 0, Exp:     No data recorded POC Auth Visits:  10       Today's Date   2/26/2025    Subjective  feels the bracing is doing good; finding herself thinking about it more throughout the day; reduced ELAYNE episodes; cont with splinting with BM: some pain - hemorrhoid - constipation side effect from medication (will need to keep taking) - Helped with stepstool for BM; BM #1 priority, intercourse #2, bladder #3 (leakage); 0-1x nocturia; Hiatal hernia- trouble eating, food will sit for days - food will not digest - could not drink water. +endo, colonoscopy. No ulcers. Cont with testing- no answers; Did have pain the last time with intercourse - would like to continue to address in PFPT       Pain: 0/10     Objective  see flowsheet for details; abdomen: mod restricted cross-hand fascial glide, increased tension & tenderness upper abdominals    Assessment  Emphasis on abdominal wall mobility including with both manual TX & with instruction in colon massage; pt able to replicate proper form. Pt able to tolerate manual TX without pain. Pt did report some eructation at end of visit.    Goals (to be met in 10 visits)   Goals       Therapy Goals     (to be met in 10 visits)   Patient will demonstrate PFM lengthening WNL with coordinated diaphragmatic breathing x 10 reps to alleviate PFM pain and muscle tension, & promote more complete bowel emptying for more WNL bowel frequency to max of 2-3x/ day.  Patient will report a reduction in use of external splinting required for defecation & flatulence by at least 75%.  Patient will demonstrate normalized tone and minimal pain  onset (</= 2/10) with internal assessment for increased tolerance to gynecological exam, tampon wear, & penetrative intercourse (Marinoff 0-1).  Patient will report use of the KNACK at least 75% of the time to mitigate ELAYNE with increases in IAP and to restore cough reflex.   Patient will report adherence to HEP for continued exercise benefits following cessation of PT.               Plan  Assess for update adding in abdominal work.    Treatment Last 4 Visits       1/22/2025 1/29/2025 2/19/2025 2/26/2025   PT Treatment   Treatment Day 3 4 5 6   Therapeutic Exercise HEP review  Instruct in LTR  Hooklying hip Abd Pradip Black TB 5\" 2 X 10  Prone B hip IR stretch 10\" X 10  S/L hip Abd 2 X 10 ea  Seated towel roll (3) external perineal stretch X 3' ea R/L Deep squat stretch at wall X 3'  Bridge with hip Add pradip 2 X 10 TM: incline L5, 1.7 mph X 5'  Deep squat stretch at wall X 3'  Prone hip IR active stretch 10 X 10\" - HEP  Supine hip IR/ ER stretch X 10 - HEP  Education: goals for TX in re: symptoms (repetition)    Neuro Re-Ed TA bracing instruction hooklying  TA bracing with breathing coordination hooklying  Bladder emptying techniques PFM breathing coordination with internal manual cueing/ feedback  Instruction in pelvic brace with internal manual cueing/ feedback  Instruction in pelvic brace with ADLs  Pelvic elevation: trial with 1 pillow under pelvis in supine & with BLE on SB Form check TA bracing, pelvic brace (external palpation TA) with breathing coordination  Pelvic brace breathing coordination with:  -hooklying hip Add pradip yellow ball X 3' with 1 pillow under pelvis  -hooklying hip Abd pradip black pilates ring X 3' with 1 pillow under pelvis  -bridge with 1 pillow under pelvis X 3' Instruction in colon massage - HEP  Education to avoid upper ab gripping/ clenching: awareness throughout day   Manual Therapy    MFR Abdomen: cross-hand fascial release, pelvic diaphragm external release, clearing diaphragm, upper  abdominals STM   Therapeutic Exercise Min 25 10 23    Neuro Re-Ed Min 20 35 17 20   Manual Therapy Min    24   Total of Timed Procedures 45 45 40 44   Total of Service Based 0 0 0 0   Total Treatment Time 45 45 40 44         HEP  Colon massage    Charges     Manual X 2, Neuro X 1

## 2025-03-05 ENCOUNTER — APPOINTMENT (OUTPATIENT)
Dept: PHYSICAL THERAPY | Age: 40
End: 2025-03-05
Attending: OBSTETRICS & GYNECOLOGY
Payer: COMMERCIAL

## 2025-03-05 ENCOUNTER — TELEPHONE (OUTPATIENT)
Dept: PHYSICAL THERAPY | Facility: HOSPITAL | Age: 40
End: 2025-03-05

## 2025-03-12 ENCOUNTER — OFFICE VISIT (OUTPATIENT)
Dept: PHYSICAL THERAPY | Age: 40
End: 2025-03-12
Attending: OBSTETRICS & GYNECOLOGY
Payer: COMMERCIAL

## 2025-03-12 PROCEDURE — 97140 MANUAL THERAPY 1/> REGIONS: CPT | Performed by: PHYSICAL THERAPIST

## 2025-03-12 NOTE — PROGRESS NOTES
Patient: Austyn Lowe (40 year old, female) Referring Provider:  Insurance:   Diagnosis: Dyspareunia in female (N94.10) Vlad Cochran  [employer] [place]   Date of Surgery: n/a Next MD visit:  N/A   Precautions:  None none scheduled - pt is looking for urogyne (reports provider had to cxl previous appt) Referral Information:    Date of Evaluation: Req: 0, Auth: 0, Exp:     No data recorded POC Auth Visits:  10       Today's Date   3/12/2025    Subjective  no significant changes, cont to splint; +colon massage: more formed BM. 2x/ wk chiro       Pain: 6/10 (LBP- sweeping, mopping. More R sided)     Objective  see flowsheet for details    Pt provided verbal consent for all manual TX provided today.   STRs/ TTP R piriformis/ glutes with improved R hip passive IR post-release; TTP R PSIS; STRs/ TTP R>L hip adductors; pelvic symmetry (no obliquity)     Assessment  Emphasis on manual TX with overall good tolerance. Pt educated in connection between external musculature treated & PFM, incuding relationship between lumbar spine & PFM. Pt aware that she may have normal muscle soreness & ecchymosis to treated muscles today.    Goals (to be met in 10 visits)    Goals       Therapy Goals     (to be met in 10 visits)   Patient will demonstrate PFM lengthening WNL with coordinated diaphragmatic breathing x 10 reps to alleviate PFM pain and muscle tension, & promote more complete bowel emptying for more WNL bowel frequency to max of 2-3x/ day.  Patient will report a reduction in use of external splinting required for defecation & flatulence by at least 75%.  Patient will demonstrate normalized tone and minimal pain onset (</= 2/10) with internal assessment for increased tolerance to gynecological exam, tampon wear, & penetrative intercourse (Marinoff 0-1).  Patient will report use of the KNACK at least 75% of the time to mitigate ELAYNE with increases in IAP and to restore cough reflex.   Patient will report adherence to HEP for  continued exercise benefits following cessation of PT.              Plan  May resume internal PFM TX pending pt feedback/ preference. 2 more visits currently scheduled.    Treatment Last 4 Visits       1/29/2025 2/19/2025 2/26/2025 3/12/2025   PT Treatment   Treatment Day 4 5 6 7   Therapeutic Exercise Deep squat stretch at wall X 3'  Bridge with hip Add angelique 2 X 10 TM: incline L5, 1.7 mph X 5'  Deep squat stretch at wall X 3'  Prone hip IR active stretch 10 X 10\" - HEP  Supine hip IR/ ER stretch X 10 - HEP  Education: goals for TX in re: symptoms (repetition)     Neuro Re-Ed PFM breathing coordination with internal manual cueing/ feedback  Instruction in pelvic brace with internal manual cueing/ feedback  Instruction in pelvic brace with ADLs  Pelvic elevation: trial with 1 pillow under pelvis in supine & with BLE on SB Form check TA bracing, pelvic brace (external palpation TA) with breathing coordination  Pelvic brace breathing coordination with:  -hooklying hip Add angelique yellow ball X 3' with 1 pillow under pelvis  -hooklying hip Abd angelique black pilates ring X 3' with 1 pillow under pelvis  -bridge with 1 pillow under pelvis X 3' Instruction in colon massage - HEP  Education to avoid upper ab gripping/ clenching: awareness throughout day    Manual Therapy   MFR Abdomen: cross-hand fascial release, pelvic diaphragm external release, clearing diaphragm, upper abdominals STM    Therapeutic Exercise Min 10 23     Neuro Re-Ed Min 35 17 20    Manual Therapy Min   24    Total of Timed Procedures 45 40 44    Total of Service Based 0 0 0    Total Treatment Time 45 40 44           1/29/2025 2/19/2025 2/26/2025 3/12/2025   Pelvic Treatment   Treatment Day 4 5 6 7   Therapeutic Exercise    Tennis ball wall MFR instruction   Manual Therapy    MFR Abdomen: cross-hand fascial release, pelvic diaphragm external release, clearing diaphragm  STM R glutes/ piriformis, R/L adductors   Therapeutic Exercise Minutes    3   Manual  Therapy Minutes    45   Total Time Of Timed Procedures    48   Total Time Of Service-Based Procedures    0   Total Treatment Time    48        HEP       Charges  3MT

## 2025-03-17 ENCOUNTER — TELEPHONE (OUTPATIENT)
Dept: PHYSICAL THERAPY | Facility: HOSPITAL | Age: 40
End: 2025-03-17

## 2025-03-19 ENCOUNTER — APPOINTMENT (OUTPATIENT)
Dept: PHYSICAL THERAPY | Age: 40
End: 2025-03-19
Attending: OBSTETRICS & GYNECOLOGY
Payer: COMMERCIAL

## 2025-03-24 ENCOUNTER — TELEPHONE (OUTPATIENT)
Dept: PHYSICAL THERAPY | Facility: HOSPITAL | Age: 40
End: 2025-03-24

## 2025-03-26 ENCOUNTER — APPOINTMENT (OUTPATIENT)
Dept: PHYSICAL THERAPY | Age: 40
End: 2025-03-26
Attending: OBSTETRICS & GYNECOLOGY
Payer: COMMERCIAL

## 2025-04-29 ENCOUNTER — TELEPHONE (OUTPATIENT)
Dept: FAMILY MEDICINE CLINIC | Facility: CLINIC | Age: 40
End: 2025-04-29

## 2025-04-29 DIAGNOSIS — Z00.00 LABORATORY EXAMINATION ORDERED AS PART OF A ROUTINE GENERAL MEDICAL EXAMINATION: Primary | ICD-10-CM

## 2025-04-29 NOTE — TELEPHONE ENCOUNTER
Patient has an appointment for her Annual Visit on 5/14/25, she would like lab work ordered so she can discuss it at her appointment with the PCP.

## 2025-05-11 LAB
ABSOLUTE BASOPHILS: 70 CELLS/UL (ref 0–200)
ABSOLUTE EOSINOPHILS: 70 CELLS/UL (ref 15–500)
ABSOLUTE LYMPHOCYTES: 2733 CELLS/UL (ref 850–3900)
ABSOLUTE MONOCYTES: 326 CELLS/UL (ref 200–950)
ABSOLUTE NEUTROPHILS: 3200 CELLS/UL (ref 1500–7800)
ALBUMIN/GLOBULIN RATIO: 1.6 (CALC) (ref 1–2.5)
ALBUMIN: 4.5 G/DL (ref 3.6–5.1)
ALKALINE PHOSPHATASE: 62 U/L (ref 31–125)
ALT: 18 U/L (ref 6–29)
AST: 19 U/L (ref 10–30)
BASOPHILS: 1.1 %
BILIRUBIN, TOTAL: 0.5 MG/DL (ref 0.2–1.2)
BUN: 9 MG/DL (ref 7–25)
CALCIUM: 9.8 MG/DL (ref 8.6–10.2)
CARBON DIOXIDE: 26 MMOL/L (ref 20–32)
CHLORIDE: 105 MMOL/L (ref 98–110)
CHOL/HDLC RATIO: 3.5 (CALC)
CHOLESTEROL, TOTAL: 163 MG/DL
CREATININE: 0.79 MG/DL (ref 0.5–0.97)
EGFR: 98 ML/MIN/1.73M2
EOSINOPHILS: 1.1 %
GLOBULIN: 2.9 G/DL (CALC) (ref 1.9–3.7)
GLUCOSE: 91 MG/DL (ref 65–99)
HDL CHOLESTEROL: 47 MG/DL
HEMATOCRIT: 45 % (ref 35–45)
HEMOGLOBIN: 13.9 G/DL (ref 11.7–15.5)
LDL-CHOLESTEROL: 93 MG/DL (CALC)
LYMPHOCYTES: 42.7 %
MCH: 27.1 PG (ref 27–33)
MCHC: 30.9 G/DL (ref 32–36)
MCV: 87.7 FL (ref 80–100)
MONOCYTES: 5.1 %
MPV: 10.3 FL (ref 7.5–12.5)
NEUTROPHILS: 50 %
NON-HDL CHOLESTEROL: 116 MG/DL (CALC)
PLATELET COUNT: 526 THOUSAND/UL (ref 140–400)
POTASSIUM: 5 MMOL/L (ref 3.5–5.3)
PROTEIN, TOTAL: 7.4 G/DL (ref 6.1–8.1)
RDW: 12.5 % (ref 11–15)
RED BLOOD CELL COUNT: 5.13 MILLION/UL (ref 3.8–5.1)
SODIUM: 140 MMOL/L (ref 135–146)
TRIGLYCERIDES: 132 MG/DL
TSH W/REFLEX TO FT4: 1.11 MIU/L
WHITE BLOOD CELL COUNT: 6.4 THOUSAND/UL (ref 3.8–10.8)

## 2025-05-14 ENCOUNTER — OFFICE VISIT (OUTPATIENT)
Dept: FAMILY MEDICINE CLINIC | Facility: CLINIC | Age: 40
End: 2025-05-14
Payer: COMMERCIAL

## 2025-05-14 VITALS
WEIGHT: 157 LBS | DIASTOLIC BLOOD PRESSURE: 82 MMHG | OXYGEN SATURATION: 99 % | SYSTOLIC BLOOD PRESSURE: 119 MMHG | HEIGHT: 65 IN | HEART RATE: 95 BPM | TEMPERATURE: 97 F | RESPIRATION RATE: 14 BRPM | BODY MASS INDEX: 26.16 KG/M2

## 2025-05-14 DIAGNOSIS — J45.21 MILD INTERMITTENT ASTHMA WITH ACUTE EXACERBATION (HCC): ICD-10-CM

## 2025-05-14 DIAGNOSIS — Z00.00 ROUTINE ADULT HEALTH MAINTENANCE: Primary | ICD-10-CM

## 2025-05-14 DIAGNOSIS — R79.89 HIGH PLATELET COUNT: ICD-10-CM

## 2025-05-14 DIAGNOSIS — F32.81 PMDD (PREMENSTRUAL DYSPHORIC DISORDER): ICD-10-CM

## 2025-05-14 PROCEDURE — 3079F DIAST BP 80-89 MM HG: CPT | Performed by: FAMILY MEDICINE

## 2025-05-14 PROCEDURE — 3008F BODY MASS INDEX DOCD: CPT | Performed by: FAMILY MEDICINE

## 2025-05-14 PROCEDURE — 3074F SYST BP LT 130 MM HG: CPT | Performed by: FAMILY MEDICINE

## 2025-05-14 PROCEDURE — 99213 OFFICE O/P EST LOW 20 MIN: CPT | Performed by: FAMILY MEDICINE

## 2025-05-14 PROCEDURE — 99395 PREV VISIT EST AGE 18-39: CPT | Performed by: FAMILY MEDICINE

## 2025-05-14 RX ORDER — NORTRIPTYLINE HYDROCHLORIDE 25 MG/1
25 CAPSULE ORAL NIGHTLY
COMMUNITY
Start: 2025-05-07 | End: 2025-05-19 | Stop reason: ALTCHOICE

## 2025-05-14 RX ORDER — DICYCLOMINE HYDROCHLORIDE 10 MG/1
10 CAPSULE ORAL 2 TIMES DAILY
COMMUNITY
Start: 2025-05-07

## 2025-05-16 NOTE — PROGRESS NOTES
The following individual(s) verbally consented to be recorded using ambient AI listening technology and understand that they can each withdraw their consent to this listening technology at any point by asking the clinician to turn off or pause the recording:    Patient name: Austyn Lowe  Additional names:  mother  Subjective:   Austyn Lowe is a 40 year old female who presents for Physical (Labs completed), Asthma (Aap-), and Immunization/Injection (Due for tdap-declines)       History/Other:   History of Present Illness  Austyn Lowe is a 40 year old female with asthma and PMDD who presents with breathing difficulties and emotional instability.    She experiences shortness of breath, particularly in confined spaces such as at work and in her car, where she needs to have a window open for air circulation. She uses albuterol as needed for her asthma symptoms, which are present year-round without specific seasonal exacerbation. She describes difficulty breathing and a sensation of phlegm in her chest that she cannot cough up, describing it as a 'deep down type of thing.'    She has a history of elevated platelet counts, with the most recent count being 526, up from 442 previously. She has seen a hematologist in the past for this issue.    She experiences emotional instability and has been diagnosed with PMDD. She reports 'uncontrollable emotions' and has tried therapy and meditation without significant improvement. She has a history of using fluoxetine and venlafaxine for anxiety and panic attacks, noting that she did better on a higher dose of fluoxetine in the past.    She is currently taking omeprazole as needed and is in the process of tapering off nortriptyline. She uses alprazolam on an as-needed basis for anxiety.   Chief Complaint Reviewed and Verified  Nursing Notes Reviewed and   Verified  Tobacco Reviewed  Allergies Reviewed  Medications Reviewed    Medical History Reviewed  Surgical History  Reviewed  Family History   Reviewed  Social History Reviewed         Tobacco:  She smoked tobacco in the past but quit greater than 12 months ago.  Tobacco Use[1]     Current Medications[2]    PHQ-2 SCORE: 0  , done 5/14/2025   Last [facility]icide Screening on 5/14/2025 was No Risk.       Review of Systems:  Pertinent items are noted in HPI.  A comprehensive review of systems was negative.      Objective:   /82   Pulse 95   Temp 97.4 °F (36.3 °C) (Temporal)   Resp 14   Ht 5' 5\" (1.651 m)   Wt 157 lb (71.2 kg)   LMP 01/27/2025 (Exact Date)   SpO2 99%   BMI 26.13 kg/m²  Estimated body mass index is 26.13 kg/m² as calculated from the following:    Height as of this encounter: 5' 5\" (1.651 m).    Weight as of this encounter: 157 lb (71.2 kg).  Results  LABS  PLT: 526 (05/14/2025)  PLT: 442     Physical Exam  GENERAL: Alert, cooperative, well developed, no acute distress  HEENT: Normocephalic, normal oropharynx, moist mucous membranes, ears normal, throat normal  CHEST: Clear to auscultation bilaterally, no wheezes, rhonchi, or crackles  CARDIOVASCULAR: Normal heart rate and rhythm, S1 and S2 normal without murmurs  ABDOMEN: Soft, non-tender, non-distended, without organomegaly, normal bowel sounds  EXTREMITIES: No cyanosis or edema  NEUROLOGICAL: Cranial nerves grossly intact, moves all extremities without gross motor or sensory deficit      Assessment & Plan:   1. Routine adult health maintenance (Primary)  2. High platelet count  -     Oncology/Hematology Referral - In Network  3. Mild intermittent asthma with acute exacerbation (HCC)  Overview:  Trigger: URI  4. PMDD (premenstrual dysphoric disorder)  Other orders  -     FLUoxetine HCl; Take 1 capsule (20 mg total) by mouth daily.  Dispense: 30 capsule; Refill: 0    Assessment & Plan  Asthma  Symptoms include dyspnea, difficulty breathing in confined spaces, and need for air circulation. Symptoms persist year-round, particularly at work and in the  car. Current management with albuterol as needed. Potential triggers include allergens and possible underlying allergies. Importance of controlling asthma to prevent status asthmaticus discussed.  - Order allergy blood test to identify potential allergens.  - Order chest x-ray to evaluate lung condition.  - Order pulmonary function test to assess lung function.  - Consider starting montelukast after test results are reviewed.    Elevated platelet count  Platelet count elevated at 526, previously 442. She has elevated platelet count and has seen a hematologist. Current count is the highest recorded. Referral to hematologist Dr. Prater is advised for further evaluation and potential additional testing.  - Refer to hematologist Dr. Prater for evaluation of elevated platelet count.    Premenstrual dysphoric disorder (PMDD) and Depression  Reports uncontrollable emotions and struggles with mental state, possibly related to PMDD. Previous therapy ineffective. Discussed medication for PMDD and associated emotional changes. Fluoxetine was previously effective for anxiety and panic attacks. Struggles with emotions and has anxiety and panic attacks. Discussed restarting fluoxetine for management of depression and anxiety. Advised to wait until discontinuation of nortriptyline due to potential interaction.  - Prescribe fluoxetine 20 mg daily, to be increased to 40 mg after one month if needed.  - Instruct to discontinue nortriptyline before starting fluoxetine.  - Follow up in three weeks to assess progress and adjust treatment as necessary.        No follow-ups on file.        Jose Roberto Vinson MD, 2025, 10:03 AM             [1]   Social History  Tobacco Use   Smoking Status Former    Current packs/day: 0.00    Average packs/day: 3.0 packs/day for 4.0 years (12.0 ttl pk-yrs)    Types: Cigarettes    Start date: 12/15/2008    Quit date: 12/15/2012    Years since quittin.4    Passive exposure: Past   Smokeless Tobacco Never    [2]   Current Outpatient Medications   Medication Sig Dispense Refill    dicyclomine 10 MG Oral Cap Take 1 capsule (10 mg total) by mouth 2 (two) times daily.      nortriptyline 25 MG Oral Cap Take 1 capsule (25 mg total) by mouth nightly.      FLUoxetine (PROZAC) 20 MG Oral Cap Take 1 capsule (20 mg total) by mouth daily. 30 capsule 0    albuterol 108 (90 Base) MCG/ACT Inhalation Aero Soln Inhale 2 puffs into the lungs every 4 (four) hours as needed for Shortness of Breath. 1 each 0    fluticasone propionate 50 MCG/ACT Nasal Suspension 2 sprays by Each Nare route daily. 1 each 0    ALPRAZolam 0.5 MG Oral Tab Take 1 tablet (0.5 mg total) by mouth daily as needed. 30 tablet 1    Levonorgest-Eth Estrad 91-Day 0.1-0.02 & 0.01 MG Oral Tab Take 1 tablet by mouth daily. 91 tablet 3    Omeprazole 40 MG Oral Capsule Delayed Release Take 1 capsule (40 mg total) by mouth every morning before breakfast.

## 2025-05-19 ENCOUNTER — OFFICE VISIT (OUTPATIENT)
Age: 40
End: 2025-05-19
Attending: INTERNAL MEDICINE
Payer: COMMERCIAL

## 2025-05-19 VITALS
BODY MASS INDEX: 26.91 KG/M2 | HEIGHT: 65 IN | SYSTOLIC BLOOD PRESSURE: 142 MMHG | TEMPERATURE: 97 F | HEART RATE: 92 BPM | DIASTOLIC BLOOD PRESSURE: 90 MMHG | WEIGHT: 161.5 LBS | OXYGEN SATURATION: 100 % | RESPIRATION RATE: 18 BRPM

## 2025-05-19 DIAGNOSIS — R79.89 ELEVATED PLATELET COUNT: Primary | ICD-10-CM

## 2025-05-19 LAB
BASOPHILS # BLD AUTO: 0.09 X10(3) UL (ref 0–0.2)
BASOPHILS NFR BLD AUTO: 1.1 %
DEPRECATED HBV CORE AB SER IA-ACNC: 58 NG/ML (ref 50–306)
EOSINOPHIL # BLD AUTO: 0.18 X10(3) UL (ref 0–0.7)
EOSINOPHIL NFR BLD AUTO: 2.2 %
ERYTHROCYTE [DISTWIDTH] IN BLOOD BY AUTOMATED COUNT: 13 %
HCT VFR BLD AUTO: 41 % (ref 35–48)
HGB BLD-MCNC: 14 G/DL (ref 12–16)
IMM GRANULOCYTES # BLD AUTO: 0.01 X10(3) UL (ref 0–1)
IMM GRANULOCYTES NFR BLD: 0.1 %
IRON SATN MFR SERPL: 22 % (ref 15–50)
IRON SERPL-MCNC: 76 UG/DL (ref 50–170)
LYMPHOCYTES # BLD AUTO: 3.67 X10(3) UL (ref 1–4)
LYMPHOCYTES NFR BLD AUTO: 44.5 %
MCH RBC QN AUTO: 28.5 PG (ref 26–34)
MCHC RBC AUTO-ENTMCNC: 34.1 G/DL (ref 31–37)
MCV RBC AUTO: 83.3 FL (ref 80–100)
MONOCYTES # BLD AUTO: 0.5 X10(3) UL (ref 0.1–1)
MONOCYTES NFR BLD AUTO: 6.1 %
NEUTROPHILS # BLD AUTO: 3.79 X10 (3) UL (ref 1.5–7.7)
NEUTROPHILS # BLD AUTO: 3.79 X10(3) UL (ref 1.5–7.7)
NEUTROPHILS NFR BLD AUTO: 46 %
PLATELET # BLD AUTO: 450 10(3)UL (ref 150–450)
RBC # BLD AUTO: 4.92 X10(6)UL (ref 3.8–5.3)
TOTAL IRON BINDING CAPACITY: 350 UG/DL (ref 250–425)
TRANSFERRIN SERPL-MCNC: 285 MG/DL (ref 250–380)
WBC # BLD AUTO: 8.2 X10(3) UL (ref 4–11)

## 2025-05-19 NOTE — PROGRESS NOTES
[facility] Note    Visit Diagnosis:  1. Elevated platelet count        History of Present Illness: 40F referred by Dr. Vinson to discuss abnormal labs.    -Since 2018, she had intermittent platelet counts > 400 but never > 450. She has no B symptoms. No history of bleeding, bruising or blood clots. No smoking or ETOH abuse. She has had some GI issues recently and her US abdomen was unremarkable. She has follow up with GI. No family history of blood disorders. Fe studies from June 2023 normal.    -08/2023 EGD/colon were unremarkable-following with Dr. Patricio Watson.     -05/2024: WBC 7.2, Hb 13.7, Plt 472 --> repeat plt 502. CRP normal. Negative JAK2, CALR MPL. Ferritin 53, Iron sat 16. She started oral iron + Vit C    Interval History  -She was taking Iron  She met with GI and stopped iron around March 2025.    -Menses light  -Has chronic fatigued  -No bleeding  -Labs May 2025: Plt 526    Review of Systems: 12 Point ROS was completed and pertinent positives are in the HPI    Current Outpatient Medications on File Prior to Visit   Medication Sig Dispense Refill    dicyclomine 10 MG Oral Cap Take 1 capsule (10 mg total) by mouth 2 (two) times daily.      FLUoxetine (PROZAC) 20 MG Oral Cap Take 1 capsule (20 mg total) by mouth daily. 30 capsule 0    albuterol 108 (90 Base) MCG/ACT Inhalation Aero Soln Inhale 2 puffs into the lungs every 4 (four) hours as needed for Shortness of Breath. 1 each 0    ALPRAZolam 0.5 MG Oral Tab Take 1 tablet (0.5 mg total) by mouth daily as needed. 30 tablet 1    Levonorgest-Eth Estrad 91-Day 0.1-0.02 & 0.01 MG Oral Tab Take 1 tablet by mouth daily. 91 tablet 3    Omeprazole 40 MG Oral Capsule Delayed Release Take 1 capsule (40 mg total) by mouth every morning before breakfast.       No current facility-administered medications on file prior to visit.     Past Medical History:    Back pain    Mild intermittent asthma (HCC)    Trigger: URI     History reviewed. No  pertinent surgical history.  Social History     Socioeconomic History    Marital status: Single   Tobacco Use    Smoking status: Former     Current packs/day: 0.00     Average packs/day: 3.0 packs/day for 4.0 years (12.0 ttl pk-yrs)     Types: Cigarettes     Start date: 12/15/2008     Quit date: 12/15/2012     Years since quittin.4     Passive exposure: Past    Smokeless tobacco: Never   Vaping Use    Vaping status: Never Used   Substance and Sexual Activity    Alcohol use: Yes     Comment: rare    Drug use: No    Sexual activity: Not Currently     Partners: Male      Family History   Problem Relation Age of Onset    Other (pituitary tumor) Father     Thyroid disease Father     No Known Problems Mother     Other (Other) Brother         thyroid disorder    No Known Problems Brother     No Known Problems Brother     Cancer Other         No family hx Breast/Ovarian/Colon Ca       Physical Exam  Height: 165.1 cm (5' 5\") (1354)  Weight: 73.3 kg (161 lb 8 oz) (1354)  BSA (Calculated - sq m): 1.81 sq meters (1354)  Pulse: 92 (1354)  BP: 142/90 (1354)  Temp: 97.1 °F (36.2 °C) (1354)  Do Not Use - Resp Rate: --  SpO2: 100 % (1354)     General: NAD, AOX3  HEENT: clear op, mmm, no jvd, no scleral icterus  CV: RRR S1S2 no murmurs  Extremities: No edema   Lungs: CTAB, no increased work of breathing  Abd: soft nt nd +BS no hepatosplenomegaly  Neuro: CN: II-XII grossly intact      Results:  Lab Results   Component Value Date    WBC 6.4 05/10/2025    HGB 13.9 05/10/2025    HCT 45.0 05/10/2025    MCV 87.7 05/10/2025     (H) 05/10/2025     Lab Results   Component Value Date     05/10/2025    K 5.0 05/10/2025    CO2 26 05/10/2025     05/10/2025    BUN 9 05/10/2025    ALB 4.5 05/10/2025       Lab Results   Component Value Date     09/15/2016       Radiology: reviewed     Pathology: n/a    Assessment and Plan:  Elevated platelets  -Likely reactive to mild iron  deficiency. Platelets normalized after starting oral iron  -Negative JAK2, CALR, MPL and normal CRP.   -[facility] with recurrent thrombocytosis. She stopped oral iron about 3 months ago  -Will repeat CBC/Fe studies and check BCR-ABL  -IF Fe studies are normal and plt count elevated, will consider a bone marrow biopsy       Working with GI-Dr. Curry. She has had an upper and lower endoscopy. Planning on possible CT.     RTC in 12 months    [facility]

## 2025-07-11 ENCOUNTER — TELEPHONE (OUTPATIENT)
Dept: FAMILY MEDICINE CLINIC | Facility: CLINIC | Age: 40
End: 2025-07-11

## 2025-07-11 RX ORDER — FLUOXETINE HYDROCHLORIDE 40 MG/1
40 CAPSULE ORAL DAILY
Qty: 30 CAPSULE | Refills: 0 | Status: SHIPPED | OUTPATIENT
Start: 2025-07-11

## 2025-07-11 NOTE — TELEPHONE ENCOUNTER
Patient called and said she needs a refill on FLUoxetine (PROZAC) 20 MG Oral Cap.  She said last time she seen Dr. Vinson they discussed increasing it to 40mg and it has not been changed yet so she would need the new dosage sent to her pharmacy.

## 2025-07-11 NOTE — TELEPHONE ENCOUNTER
Ok sure, prozac 40mg sent. If patient doesn't feel much better with 40mg, please let me know.     If she develops any suicidal thoughts, seek medical attention through the ER.

## 2025-07-11 NOTE — TELEPHONE ENCOUNTER
Last OV 5/14/25  Premenstrual dysphoric disorder (PMDD) and Depression  Reports uncontrollable emotions and struggles with mental state, possibly related to PMDD. Previous therapy ineffective. Discussed medication for PMDD and associated emotional changes. Fluoxetine was previously effective for anxiety and panic attacks. Struggles with emotions and has anxiety and panic attacks. Discussed restarting fluoxetine for management of depression and anxiety. Advised to wait until discontinuation of nortriptyline due to potential interaction.  - Prescribe fluoxetine 20 mg daily, to be increased to 40 mg after one month if needed.    Ok to send Fluoxetine 40mg?

## 2025-07-18 ENCOUNTER — TELEPHONE (OUTPATIENT)
Facility: CLINIC | Age: 40
End: 2025-07-18

## 2025-07-18 DIAGNOSIS — Z30.41 ENCOUNTER FOR SURVEILLANCE OF CONTRACEPTIVE PILLS: ICD-10-CM

## 2025-07-18 DIAGNOSIS — F32.81 PMDD (PREMENSTRUAL DYSPHORIC DISORDER): ICD-10-CM

## 2025-07-18 RX ORDER — LEVONORGESTREL AND ETHINYL ESTRADIOL 100-20(84)
1 KIT ORAL DAILY
Qty: 91 TABLET | Refills: 0 | Status: SHIPPED | OUTPATIENT
Start: 2025-07-18

## 2025-08-20 RX ORDER — FLUOXETINE HYDROCHLORIDE 40 MG/1
40 CAPSULE ORAL DAILY
Qty: 30 CAPSULE | Refills: 0 | Status: SHIPPED | OUTPATIENT
Start: 2025-08-20

## 2025-08-26 RX ORDER — FLUOXETINE HYDROCHLORIDE 40 MG/1
40 CAPSULE ORAL DAILY
Qty: 30 CAPSULE | Refills: 0 | OUTPATIENT
Start: 2025-08-26

## (undated) NOTE — LETTER
Date: 2/12/2025    Patient Name: Austyn Lowe          To Whom it may concern:    The above patient was seen at [facility] for treatment of a medical condition. Please excuse her from attending work from 2/10/25 through 2/16/25.          Sincerely,        Lora Burden MD

## (undated) NOTE — LETTER
ASTHMA ACTION PLAN for Austyn Lowe     : 1985     Date: 2025  Provider:  Jose Roberto Vinson MD  Phone for doctor or clinic: [facility], [address] [place] [id number] [phone]    ACT Score: 19      You can use the colors of a traffic light to help learn about your asthma medicines.      1. Green - Go! % of Personal Best Peak Flow Use controller medicine.   Breathing is good  No cough or wheeze  Can work and play Medicine How much to take When to take it    Asthma controlled no daily meds needed      2. Yellow - Caution. 50-79% Personal Best Peak  Flow.  Use reliever medicine to keep an asthma attack from getting bad.   Cough  Wheezing  Tight Chest  Wake up at night Medicine How much to take When to take it    Albuterol inhaler,  2 puffs every four hours as needed.         Additional instructions         3. Red - Stop! Danger!  <50% Personal Best Peak  Flow. Take these medications until  Get help from a doctor   Medicine not helping  Breathing is hard and fast  Nose opens wide  Can't walk  Ribs show  Can't talk well Medicine How much to take When to take it    Call 911 and or PCP, or go to nearest ER     Additional Instructions If your symptoms do not improve and you cannot contact your doctor, [facility] or call 911 immediately!     [x] Asthma Action Plan reviewed with patient (and caregiver if necessary) and a copy of the plan was given to the patient/caregiver.   [] Asthma Action Plan reviewed with patient (and caregiver if necessary) on the phone and mailed copy to patient or submitted via [employer].     Signatures:  Provider  Jose Roberto Vinson MD   Patient Caretaker

## (undated) NOTE — LETTER
Date: 1/10/2018    Patient Name: Charisse Cali          To Whom it may concern:      Please excuse from work due to illness from Wednesday 1/10/18  through Monday 1/15/18.          Sincerely,    Kaylyn Iglesias MD

## (undated) NOTE — LETTER
06/21/18        Austyn Lowe  [address] [facility]      Dear Koko ,    [facility] records indicate that you have outstanding lab work and or testing that was ordered for you and has not yet been completed:          CBC, Platelet, No Differ

## (undated) NOTE — LETTER
Date & Time: 8/17/2020, 7:37 PM  Patient: Meryl Trammell  Encounter Provider(s):    Yadira Angulo MD       To Whom It May Concern:    Meryl Trammell was seen and treated in our department on 8/17/2020.  She should not return to work until cleared by heal

## (undated) NOTE — ED AVS SNAPSHOT
Kavitha Pruitt   MRN: [id number]    Department:  [facility] in [place]   Date of Visit:  10/6/2017           Disclosure     Insurance plans vary and the physician(s) referred by the ER may not be covered by your plan.  Please contac If you have been prescribed any medication(s), please fill your prescription right away and begin taking the medication(s) as directed    If the emergency physician has read X-rays, these will be re-interpreted by a radiologist.  If there is a significant

## (undated) NOTE — ED AVS SNAPSHOT
Betsy Pozo   MRN: [id number]    Department:  [facility] in [place]   Date of Visit:  10/21/2018           Disclosure     Insurance plans vary and the physician(s) referred by the ER may not be covered by your plan.  Please contact tell this physician (or your personal doctor if your instructions are to return to your personal doctor) about any new or lasting problems. The primary care or specialist physician will see patients referred from the [facility] Emergency Department.  Rod Rust